# Patient Record
Sex: MALE | Race: BLACK OR AFRICAN AMERICAN | Employment: FULL TIME | URBAN - METROPOLITAN AREA
[De-identification: names, ages, dates, MRNs, and addresses within clinical notes are randomized per-mention and may not be internally consistent; named-entity substitution may affect disease eponyms.]

---

## 2019-03-28 ENCOUNTER — OFFICE VISIT (OUTPATIENT)
Dept: FAMILY MEDICINE CLINIC | Age: 19
End: 2019-03-28
Payer: COMMERCIAL

## 2019-03-28 VITALS
HEIGHT: 72 IN | SYSTOLIC BLOOD PRESSURE: 114 MMHG | HEART RATE: 75 BPM | WEIGHT: 221 LBS | BODY MASS INDEX: 29.93 KG/M2 | RESPIRATION RATE: 16 BRPM | TEMPERATURE: 98.1 F | DIASTOLIC BLOOD PRESSURE: 74 MMHG | OXYGEN SATURATION: 98 %

## 2019-03-28 DIAGNOSIS — J06.9 VIRAL URI: Primary | ICD-10-CM

## 2019-03-28 LAB — S PYO AG THROAT QL: NORMAL

## 2019-03-28 PROCEDURE — 99213 OFFICE O/P EST LOW 20 MIN: CPT | Performed by: PHYSICIAN ASSISTANT

## 2019-03-28 PROCEDURE — 87880 STREP A ASSAY W/OPTIC: CPT | Performed by: PHYSICIAN ASSISTANT

## 2019-03-28 RX ORDER — LORATADINE 10 MG/1
10 TABLET ORAL DAILY
Qty: 15 TABLET | Refills: 0 | Status: SHIPPED | OUTPATIENT
Start: 2019-03-28

## 2021-01-21 ENCOUNTER — APPOINTMENT (OUTPATIENT)
Dept: GENERAL RADIOLOGY | Age: 21
End: 2021-01-21
Payer: COMMERCIAL

## 2021-01-21 ENCOUNTER — HOSPITAL ENCOUNTER (EMERGENCY)
Age: 21
Discharge: HOME | End: 2021-01-21
Payer: COMMERCIAL

## 2021-01-21 VITALS
DIASTOLIC BLOOD PRESSURE: 75 MMHG | TEMPERATURE: 97.6 F | HEART RATE: 68 BPM | BODY MASS INDEX: 29.12 KG/M2 | RESPIRATION RATE: 16 BRPM | SYSTOLIC BLOOD PRESSURE: 140 MMHG | WEIGHT: 215 LBS | OXYGEN SATURATION: 98 % | HEIGHT: 72 IN

## 2021-01-21 DIAGNOSIS — S93.402A SPRAIN OF LEFT ANKLE, UNSPECIFIED LIGAMENT, INITIAL ENCOUNTER: Primary | ICD-10-CM

## 2021-01-21 PROCEDURE — 99284 EMERGENCY DEPT VISIT MOD MDM: CPT

## 2021-01-21 PROCEDURE — 73610 X-RAY EXAM OF ANKLE: CPT

## 2021-01-21 PROCEDURE — 6370000000 HC RX 637 (ALT 250 FOR IP): Performed by: PHYSICIAN ASSISTANT

## 2021-01-21 RX ORDER — IBUPROFEN 800 MG/1
800 TABLET ORAL ONCE
Status: COMPLETED | OUTPATIENT
Start: 2021-01-21 | End: 2021-01-21

## 2021-01-21 RX ORDER — NAPROXEN 500 MG/1
500 TABLET ORAL 2 TIMES DAILY
Qty: 14 TABLET | Refills: 0 | Status: SHIPPED | OUTPATIENT
Start: 2021-01-21 | End: 2021-01-28

## 2021-01-21 RX ADMIN — IBUPROFEN 800 MG: 800 TABLET ORAL at 23:07

## 2021-01-21 ASSESSMENT — PAIN SCALES - GENERAL: PAINLEVEL_OUTOF10: 8

## 2021-01-21 NOTE — LETTER
[address] [facility] Emergency Department  [address] [place] [address]  Phone: [phone]         January 22, 2021     Patient: Mike Age   YOB: 2000   Date of Visit: 1/21/2021       To Whom It May Concern:    Dylan Liz was seen and treated in our emergency department on 1/21/2021. The   follow up should be arranged as soon as possible with the company's occupational health provider* or the specialist to whom the worker was referred For work release or return to work.     *If the company does not have an occupational health provider, follow up should be at [address] [facility]  [address]  Call in 1 day            Sincerely,        Maddi Tobias RN        Signature:__________________________________

## 2021-01-22 NOTE — ED NOTES
Bed: 11  Expected date:   Expected time:   Means of arrival:   Comments:  EMS      Erickson Andino RN  44/97/37 [id number]

## 2021-01-22 NOTE — ED PROVIDER NOTES
[facility]  HPI:  1/21/21, Time: 10:23 PM BRYAN Graham III is a 23 y.o. male presenting to the ED for left ankle injury, beginning prior to arrival the complaint has been persistent, moderate in severity, and worsened by movement of left Ankle . Patient comes in with complaint of left ankle injury. He was at work is on the last step and when he jumped down he caught the edge of a sledgehammer causing his ankle to invert. Did not fall there was no head injury no loss of consciousness no neck pain. Denies any numbness tingling. Patient was brought by EMS    Review of Systems:   A complete review of systems was performed and pertinent positives and negatives are stated within HPI, all other systems reviewed and are negative.          --------------------------------------------- PAST HISTORY ---------------------------------------------  Past Medical History:  has a past medical history of Benign focal epilepsy of childhood ([facility] [address] Past Surgical History:  has no past surgical history on file. Social History:  reports that he has never smoked. He has never used smokeless tobacco. He reports that he does not drink alcohol or use drugs. Family History: family history includes Cancer in his maternal aunt and maternal grandfather; Diabetes in his paternal grandfather; Heart Disease in his paternal grandfather; Hypertension in his paternal grandfather; Liver Disease in his mother; Sickle Cell Anemia in his maternal grandfather; Sickle Cell Trait in his maternal aunt. The patients home medications have been reviewed. Allergies: Bee venom and Desonide    -------------------------------------------------- RESULTS -------------------------------------------------  All laboratory and radiology results have been personally reviewed by myself   LABS:  No results found for this visit on 01/21/21.     RADIOLOGY:  Interpreted by Radiologist.  XR ANKLE LEFT (MIN 3 VIEWS)   Final Result   Lateral malleolar soft tissue swelling and small joint effusion. No acute   osseous findings with preserved overall alignment at this time. RECOMMENDATION:   In the setting of trauma, if there is persistent symptoms and physical exam   warrants a repeat radiograph in 10-14 days could be considered as occult   fractures may not be evident on initial imaging evaluation.          ------------------------- NURSING NOTES AND VITALS REVIEWED ---------------------------   The nursing notes within the ED encounter and vital signs as below have been reviewed. BP (!) 140/75   Pulse 68   Temp 97.6 °F (36.4 °C)   Resp 16   Ht 6' (1.829 m)   Wt 215 lb (97.5 kg)   SpO2 98%   BMI 29.16 kg/m²   Oxygen Saturation Interpretation: Normal      ---------------------------------------------------PHYSICAL EXAM--------------------------------------      Constitutional/General: Alert and oriented x3, well appearing, non toxic in NAD  Head: Normocephalic and atraumatic  Eyes: PERRL, EOMI  Mouth: Oropharynx clear, handling secretions, no trismus  Neck: Supple, full ROM, no vertebral point tenderness   pulmonary: Lungs clear to auscultation bilaterally, no wheezes, rales, or rhonchi. Not in respiratory distress  Cardiovascular:  Regular rate and rhythm, no murmurs, gallops, or rubs. 2+ distal pulses  Abdomen: Soft, non tender, non distended,   Extremities: Moves all extremities x 4. Warm and well perfused tenderness of the left lateral malleolus with swelling present there is no tenderness of the dorsal aspect of the foot pulses normal cap refill less than 2 seconds normal sensation  Skin: warm and dry without rash  Neurologic: GCS 15,  Psych: Normal Affect      ------------------------------ ED COURSE/MEDICAL DECISION MAKING----------------------  Medications   ibuprofen (ADVIL;MOTRIN) tablet 800 mg (800 mg Oral Given 1/21/21 2307)         ED COURSE:       Medical Decision Making:    Patient came in with injury to left ankle.   X-ray no acute fracture he was placed in a ankle Aircast given crutches discharged with Naprosyn he is to follow-up with occupational medicine if Worker's Comp. applies. Primary care if not being seen for Worker's Comp. Counseling: The emergency provider has spoken with the patient and discussed todays results, in addition to providing specific details for the plan of care and counseling regarding the diagnosis and prognosis. Questions are answered at this time and they are agreeable with the plan.      --------------------------------- IMPRESSION AND DISPOSITION ---------------------------------    IMPRESSION  1. Sprain of left ankle, unspecified ligament, initial encounter        DISPOSITION  Disposition: Discharge to home  Patient condition is good      NOTE: This report was transcribed using voice recognition software.  Every effort was made to ensure accuracy; however, inadvertent computerized transcription errors may be present     Lynn Wu  01/21/21 [id number]

## 2021-03-08 ENCOUNTER — HOSPITAL ENCOUNTER (OUTPATIENT)
Dept: PHYSICAL THERAPY | Age: 21
Setting detail: THERAPIES SERIES
Discharge: HOME | End: 2021-03-08
Payer: COMMERCIAL

## 2021-03-08 PROCEDURE — G0283 ELEC STIM OTHER THAN WOUND: HCPCS

## 2021-03-08 PROCEDURE — 97161 PT EVAL LOW COMPLEX 20 MIN: CPT

## 2021-03-08 NOTE — PROGRESS NOTES
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 ON 2021 client w was stepping down from a height of approximately 3 feet Client's left foot came down on a sledgehammer head causing an acute inversion injury. Client was seen by ER same day. Xrays were negative Client followed up with [facility] and was also seen by Dr Xavi Zapata on an Orthopedic consult. No past hx of left ankle injury  Family / Caregiver Present: No  Referring Practitioner: Dr Xavi Zapata  Referral Date : 21  Diagnosis: Left Ankle Sprain  Follows Commands: Within Functional Limits  PT Visit Information  Onset Date: 21  PT Insurance Information: [employer]  Total # of Visits Approved: 18  Plan of Care/Certification Expiration Date: 21  Subjective  Subjective: Client reports the acute swelling and pain ghave reduced but not resolved Client is not currently working Activity is limited by left ankle pain  Pain Screening  Patient Currently in Pain: No  Vital Signs  Patient Currently in Pain: No    Vision/Hearing  Vision  Vision: Within Functional Limits  Hearing  Hearing: Within functional limits    Orientation  Orientation  Overall Orientation Status: Within Functional Limits    Social/Functional History  Social/Functional History  Lives With: Family  Type of Home: House  Home Layout: Work area in [employer] Responsibilities: No  Active : Yes  Mode of Transportation: Car  Occupation: Full time employment; Workers comp    Objective     Observation/Palpation  Palpation: Pain with palpation left ankle lateral malleolus Moderate edema palpable

## 2021-03-09 ENCOUNTER — HOSPITAL ENCOUNTER (OUTPATIENT)
Dept: PHYSICAL THERAPY | Age: 21
Setting detail: THERAPIES SERIES
Discharge: HOME | End: 2021-03-09
Payer: COMMERCIAL

## 2021-03-09 NOTE — PROGRESS NOTES
[facility]  Phone: [phone] Fax: [phone]       Physical Therapy Daily Treatment Note  Date:  3/9/2021    Patient Name:  Harman Peterson    :  2000  MRN: [id number]    Restrictions/Precautions:    Diagnosis:  Left Ankle Sprain  Treatment Diagnosis:    Insurance/Certification information: [employer] (18 visits thru 2021)  Referring Physician: Dr. Elnoria Ormond of care signed (Y/N):    Visit# / total visits:    Pain level: /10  Time In: 7:15       Time Out: 8:00         Subjective: Pt reports stiffness in his left ankle which he notes is typical in the morning. Exercises:  Exercise/Equipment Resistance/Repetitions Other comments   Bike  7 min    Calf stretch 10\" 5 x w/flexband   Inv/Ev stretch 10\" 5 x each W/flexband   Active DF/PF 10 x                                                                                                                  Other Therapeutic Activities:      Home Exercise Program:      Manual Treatments:      Modalities: IFC and ice to left ankle x 20 min for pain and edema control    Timed Code Treatment Minutes:  40    Total Treatment Minutes:  45    Treatment/Activity Tolerance:  [x] Patient tolerated treatment well [] Patient limited by fatigue  [] Patient limited by pain  [] Patient limited by other medical complications  [x] Other: Good tolerance for initiation of exercises. Plan:   [x] Continue per plan of care [] Alter current plan (see comments)  [] Plan of care initiated [] Hold pending MD visit [] Discharge  Plan for Next Session:         Treatment Charges: Mins Units   Initial Evaluation     Re-Evaluation     Ther Exercise         TE 20 1   Manual Therapy     MT     Ther Activities        TA     Gait Training          GT     Neuro Re-education NR     Modalities 20 1   Non-Billable Service Time     Other 5    Total Time/Units 45 2     Electronically signed by:   Devonte Templeton [id number]

## 2021-03-11 ENCOUNTER — HOSPITAL ENCOUNTER (OUTPATIENT)
Dept: PHYSICAL THERAPY | Age: 21
Setting detail: THERAPIES SERIES
Discharge: HOME | End: 2021-03-11
Payer: COMMERCIAL

## 2021-03-11 PROCEDURE — G0283 ELEC STIM OTHER THAN WOUND: HCPCS

## 2021-03-11 PROCEDURE — 97110 THERAPEUTIC EXERCISES: CPT

## 2021-03-11 NOTE — PROGRESS NOTES
[facility]  Phone: [phone] Fax: [phone]       Physical Therapy Daily Treatment Note  Date:  3/11/2021    Patient Name:  Neda Stevenson    :  2000  MRN: [id number]    Restrictions/Precautions:    Diagnosis:  Left Ankle Sprain  Treatment Diagnosis:    Insurance/Certification information: [employer] (18 visits thru 2021)  Referring Physician: Dr. Boss [place] of care signed (Y/N):    Visit# / total visits:  3/18  Pain level: /10  Time In: 7:15       Time Out: 8:05        Subjective: Pt states he is returning to light duty work next week. He voices no new c/o this morning. Exercises:  Exercise/Equipment Resistance/Repetitions Other comments   Bike  10 min    Calf stretch 10\" 5 x w/flexband   Inv/Ev stretch 10\" 5 x each W/flexband   Active DF/PF 10 x nt          Tband DF/PF/Inv/Ev GTB 10 x each           Step to disc fwd 5\" 5 x                                                                                      Other Therapeutic Activities:      Home Exercise Program:  (3/11/2021) Instructed pt in light strengthening using tband. Gave pt GTB for home. Manual Treatments:      Modalities: IFC and ice to left ankle x 20 min for pain and edema control    Timed Code Treatment Minutes:  45    Total Treatment Minutes:  50    Treatment/Activity Tolerance:  [x] Patient tolerated treatment well [] Patient limited by fatigue  [] Patient limited by pain  [] Patient limited by other medical complications  [x] Other: Good tolerance for progression of exercises.      Plan:   [x] Continue per plan of care [] Alter current plan (see comments)  [] Plan of care initiated [] Hold pending MD visit [] Discharge  Plan for Next Session:         Treatment Charges: Mins Units   Initial Evaluation     Re-Evaluation     Ther Exercise         TE 25 2   Manual Therapy     MT     Ther Activities        TA     Gait Training          GT     Neuro Re-education NR

## 2021-03-15 ENCOUNTER — HOSPITAL ENCOUNTER (OUTPATIENT)
Dept: PHYSICAL THERAPY | Age: 21
Setting detail: THERAPIES SERIES
Discharge: HOME | End: 2021-03-15
Payer: COMMERCIAL

## 2021-03-15 PROCEDURE — 97110 THERAPEUTIC EXERCISES: CPT

## 2021-03-15 PROCEDURE — G0283 ELEC STIM OTHER THAN WOUND: HCPCS

## 2021-03-15 NOTE — PROGRESS NOTES
[facility]  Phone: [phone] Fax: [phone]       Physical Therapy Daily Treatment Note  Date:  3/15/2021    Patient Name:  Talia Luther    :  2000  MRN: [id number]    Restrictions/Precautions:    Diagnosis:  Left Ankle Sprain  Treatment Diagnosis:    Insurance/Certification information: [employer] (18 visits thru 2021)  Referring Physician: Dr. Delma Pagan of care signed (Y/N):    Visit# / total visits:    Pain level: /10  Time In: 1625      Time Out: 1710        Subjective:    Exercises:  Exercise/Equipment Resistance/Repetitions Other comments   Bike  10 min    Calf stretch 10\" 5 x w/flexband                    Tband DF/PF/Inv/Ev GTB 10 x each           Step to disc fwd 5\" 5 x nt     BAPS FWD/Back Level 2 10 reps 2 sets     Baps Side/side    Level 2 10 reps 2 sets     BAPS CW/CCW     Level 2 5 reps 2 sets each                                                               Other Therapeutic Activities:      Home Exercise Program:  (3/11/2021) Instructed pt in light strengthening using tband. Gave pt GTB for home. Manual Treatments:      Modalities: IFC and ice to left ankle x 20 min for pain and edema control    Timed Code Treatment Minutes:  30    Total Treatment Minutes:  50    Treatment/Activity Tolerance:  [x] Patient tolerated treatment well [] Patient limited by fatigue  [] Patient limited by pain  [] Patient limited by other medical complications  [x] Other: Good tolerance for progression of exercises.      Plan:   [x] Continue per plan of care [] Alter current plan (see comments)  [] Plan of care initiated [] Hold pending MD visit [] Discharge  Plan for Next Session:         Treatment Charges: Mins Units   Initial Evaluation     Re-Evaluation     Ther Exercise         TE 25 2   Manual Therapy     MT     Ther Activities        TA     Gait Training          GT     Neuro Re-education NR     Modalities 20 1   Non-Billable Service Time     Other 5    Total Time/Units 40 2     Electronically signed by:  Kassandra Bolden [id number]

## 2021-03-17 ENCOUNTER — HOSPITAL ENCOUNTER (OUTPATIENT)
Dept: PHYSICAL THERAPY | Age: 21
Setting detail: THERAPIES SERIES
Discharge: HOME | End: 2021-03-17
Payer: COMMERCIAL

## 2021-03-17 PROCEDURE — 97110 THERAPEUTIC EXERCISES: CPT

## 2021-03-17 NOTE — FLOWSHEET NOTE
[facility]  Phone: [phone] Fax: [phone]     Industrial Rehabilitation Initial Evaluation      Client Name: Paty Marroquin     Claim Number:[id number]  Evaluation date:03/08/2021                                     Job Title : Head Stretcher   Diagnosis:Left ankle Injury R20.672X                         Normal Work Hrs:  _40_/wk  Referring Physician: Dr Arcenio Butt                                   Present work status:   ________  First date of Lost time:01/21/2021                            * Not working  _X__  Date of Injury: 01/21/2021                                          Ileene Collar Duty    ____  Employer:[employer]                                     * Reg.  Duty     ____    Authorized Services:   _X__ Physical Therapy Exercises  ___ Work Conditioning  ___ Aquatics  _X__ Manual therapy  _X__ Electrical Stimulation, Traction, Ultrasound  _X__ Education/Body mechanics  ___ Ergonomic Assessment/FCE    Authorized Visits: ________  [employer] __18___Visits  By 04/13/2021   From __03/08/2021__ to 04/13/2021    Vocational Status:  Employer: [employer]   Job Title: Head Stretcher     Rehabilitation Problems/Impairments:  [x]Pain  Lateral and postero-lateral left ankle 4-6/10   [x]Decreased ROM left anjkle All ranges limited   []Joint Hypomobility:______________________________________  []Joint Hypermobility:______________________________________  []Muscle Spasms:_________________________________________  [x]Gait:Reduced stance time and reduced step length left LE   [x]Decreased strength Left ankle most notable Inversion and eversion   []Unsafe body mechanics related to work/home activities  [x]Subjective reports of pain limiting work/home activities  []Inability to control onset symptoms  []Load handling strength levels below employable levels  []Work endurances less than required for employment levels  [x]Other:Moderate to Marked  edema over the left lateral Malleolus Short Term Rehabilitation Goals:                    [x] Decrease pain _5/10 or less                     [] Increase ROM:___________________________________________                    [] Improve Joint play:________________________________________                    [] Improve strength:_________________________________________                    []Gait: __________________________________________________                    [] Reduce Muscle Spasms:____________________________________                    [] Improve Body Mechanics associated with work/Home activities. [x] Instruct with symptom control techniques/ HEP                    [] Improve endurance levels                     [] Lift floor-waist __________#                     [] Lift waist-shoulder _______#                    [] Horizontal carry _________Ft ________#                    [] Elevated work ________min _________#                    [x] Other: Left ankle edema Moderate or less     Long Term Rehabilitation Goals:   [x]RTW  ( SJ/SE  [x]Decrease Pain _3/10 or less with work and home ADL's and normal ambulation   [x]Improve AROM  Of the left ankle to WFL's all ranges   [x]Improve Strength Left ankle to 4- to 4/5   []Improve Joint play (WNL), _______________________________  []Body Mechanics:________________________________________              [x]Gait: Minimal deviation over functional distances   [x]Independent with HEP/Symptom control techniques:  []Lift Floor-waist_____#  []Lift waist-Shoulder________#  []Horizontal Carry ______ft______#  []Elevated work ____min____#  []Other:_________________________________________________    Rehabilitation Recommendations:                    ?[x] Begin PT at _2-3_____x/wk x _6_____wks. []Begin PT with Work Conditioning to follow at ______wks.                    []Begin Work-Conditioning and conclude with FCE _____                   []? Begin PT with Vocational Rehabilitation referral/request--[facility]                   ? [] Ergonomic Study/Job Analysis to compare FCE/clients abilities. ____    Recommendations For RTW accommodations:  None at present           Physician: ____________________  Therapist: Patsy Izaguirre, [facility]  : ____________________

## 2021-03-17 NOTE — PROGRESS NOTES
[facility]  Phone: [phone] Fax: [phone]       Physical Therapy Daily Treatment Note  Date:  3/17/2021    Patient Name:  Rao Tavarez    :  2000  MRN: [id number]    Restrictions/Precautions:    Diagnosis:  Left Ankle Sprain  Treatment Diagnosis:    Insurance/Certification information: [employer] (18 visits thru 2021)  Referring Physician: Dr. Gerald Francisco of care signed (Y/N):    Visit# / total visits:    Pain level: /10  Time In: 16:20     Time Out: 16:50        Subjective: Pt reports no pain/edema related to returning to work this week. States he is working light duty but it includes walking a lot. Exercises:  Exercise/Equipment Resistance/Repetitions Other comments   Bike  10 min    Calf stretch 10\" 5 x w/flexband   Inv/Ev stretch 10\" 5 x w/flexband               Tband DF/PF/Inv/Ev GTB 10 x each nt          Step to disc fwd 5\" 10 x      BAPS FWD/Back Level 3 15 reps     Baps Side/side    Level 3 15 reps    BAPS CW/CCW     Level 3 10 reps  each                                                               Other Therapeutic Activities:      Home Exercise Program:  (3/11/2021) Instructed pt in light strengthening using tband. Gave pt GTB for home. Manual Treatments:      Modalities:  NT per pt    Timed Code Treatment Minutes:  25    Total Treatment Minutes:  30    Treatment/Activity Tolerance:  [x] Patient tolerated treatment well [] Patient limited by fatigue  [] Patient limited by pain  [] Patient limited by other medical complications  [x] Other: Pt is tolerating progression of left ankle ROM/strengthening exercises without c/o.      Plan:   [x] Continue per plan of care [] Alter current plan (see comments)  [] Plan of care initiated [] Hold pending MD visit [] Discharge  Plan for Next Session:         Treatment Charges: Mins Units   Initial Evaluation     Re-Evaluation     Ther Exercise         TE 25 2   Manual Therapy     MT     Ther Activities        TA     Gait Training          GT     Neuro Re-education NR     Modalities     Non-Billable Service Time     Other 5    Total Time/Units 30 2     Electronically signed by:   Puma Elmore

## 2021-03-23 ENCOUNTER — HOSPITAL ENCOUNTER (OUTPATIENT)
Dept: PHYSICAL THERAPY | Age: 21
Setting detail: THERAPIES SERIES
Discharge: HOME | End: 2021-03-23
Payer: COMMERCIAL

## 2021-03-23 PROCEDURE — 97110 THERAPEUTIC EXERCISES: CPT

## 2021-03-23 PROCEDURE — G0283 ELEC STIM OTHER THAN WOUND: HCPCS

## 2021-03-23 NOTE — PROGRESS NOTES
[facility]  Phone: [phone] Fax: [phone]       Physical Therapy Daily Treatment Note  Date:  3/23/2021    Patient Name:  Vick Miller    :  2000  MRN: [id number]    Restrictions/Precautions:    Diagnosis:  Left Ankle Sprain  Treatment Diagnosis:    Insurance/Certification information: [employer] (18 visits thru 2021)  Referring Physician: Dr. Shine Saez of care signed (Y/N):    Visit# / total visits:    Pain level: /10  Time In: 15:35     Time Out: 16:25        Subjective: Pt reports no pain this afternoon. States when he has pain it is usually first thing in the morning or at night. Exercises:  Exercise/Equipment Resistance/Repetitions Other comments   Bike  10 min    Calf stretch 10\" 5 x @ incline   Toe raises 10 x    Step to disc 5\" 10 x    Resisted side stepping   OTB 20' x 2 B                                     Unil stand (L) reach med ball   2 kg 10 x                Diagonal reach 2 kg 7 x B            Throw @ rebounder                                   Other Therapeutic Activities:      Home Exercise Program:  (3/11/2021) Instructed pt in light strengthening using tband. Gave pt GTB for home. Manual Treatments:      Modalities: IFC and ice to left ankle x 20 min for pain and edema control - pt seated    Timed Code Treatment Minutes:  45    Total Treatment Minutes:  50    Treatment/Activity Tolerance:  [x] Patient tolerated treatment well [] Patient limited by fatigue  [] Patient limited by pain  [] Patient limited by other medical complications  [x] Other: Pt tolerated ex progression well. He reports numbness in his left foot after estim and ice when treated on a table and again today seated in a chair w/LLE on a stool. This resolves within a few minutes of changing position per pt. Will discuss with PT and modify treatment as appropriate.      Plan:   [x] Continue per plan of care [] Alter current plan (see comments)  [] Plan of care initiated [] Hold pending MD visit [] Discharge  Plan for Next Session:         Treatment Charges: Mins Units   Initial Evaluation     Re-Evaluation     Ther Exercise         TE 25 2   Manual Therapy     MT     Ther Activities        TA     Gait Training          GT     Neuro Re-education NR     Modalities 20 1   Non-Billable Service Time     Other 5    Total Time/Units 50 2     Electronically signed by:   Puma Elmore

## 2021-03-25 ENCOUNTER — HOSPITAL ENCOUNTER (OUTPATIENT)
Dept: PHYSICAL THERAPY | Age: 21
Setting detail: THERAPIES SERIES
Discharge: HOME | End: 2021-03-25
Payer: COMMERCIAL

## 2021-03-25 PROCEDURE — G0283 ELEC STIM OTHER THAN WOUND: HCPCS

## 2021-03-25 PROCEDURE — 97110 THERAPEUTIC EXERCISES: CPT

## 2021-03-25 NOTE — PROGRESS NOTES
Evaluation     Re-Evaluation     Ther Exercise         TE 20 1   Manual Therapy     MT     Ther Activities        TA     Gait Training          GT     Neuro Re-education NR     Modalities 20 1   Non-Billable Service Time     Other 5    Total Time/Units 45 2     Electronically signed by:   Puma Elmore

## 2021-03-30 ENCOUNTER — HOSPITAL ENCOUNTER (OUTPATIENT)
Dept: PHYSICAL THERAPY | Age: 21
Setting detail: THERAPIES SERIES
Discharge: HOME | End: 2021-03-30
Payer: COMMERCIAL

## 2021-03-31 ENCOUNTER — HOSPITAL ENCOUNTER (OUTPATIENT)
Dept: PHYSICAL THERAPY | Age: 21
Setting detail: THERAPIES SERIES
Discharge: HOME | End: 2021-03-31
Payer: COMMERCIAL

## 2021-03-31 PROCEDURE — G0283 ELEC STIM OTHER THAN WOUND: HCPCS

## 2021-03-31 PROCEDURE — 97110 THERAPEUTIC EXERCISES: CPT

## 2021-03-31 NOTE — PROGRESS NOTES
[facility]  Phone: [phone] Fax: [phone]       Physical Therapy Daily Treatment Note  Date:  3/31/2021    Patient Name:  Lokesh Cardona    :  2000  MRN: [id number]    Restrictions/Precautions:    Diagnosis:  Left Ankle Sprain  Treatment Diagnosis:    Insurance/Certification information: [employer] (18 visits thru 2021)  Referring Physician: Dr. Ann-Marie Graham of care signed (Y/N):    Visit# / total visits:    Pain level: /10  Time In: 13:25     Time Out: 14:20        Subjective: Pt to therapy after follow up with Dr. Benjy Sandoval. He is to continue light duty and PT and return for follow up on 2021. Exercises:  Exercise/Equipment Resistance/Repetitions Other comments   Bike  10 min    DF/Inv/EV str w/flexband 5 x each    Toe raises 10 x    Step to disc fwd 5\" 10 x                           lateral 5\" 10 x Close S for stability   Resisted side stepping   OTB 20' x 2 B nt   LLE stand/R heel fwd touch down 4\" 10 x                                Unil stand (L) reach med ball   2 kg 10 x                Diagonal reach 2 kg 5 x B            Throw @ rebounder 2 kg 10 x                                  Other Therapeutic Activities:      Home Exercise Program:  (3/11/2021) Instructed pt in light strengthening using tband. Gave pt GTB for home. Manual Treatments:      Modalities: IFC and ice to left ankle x 20 min for pain and edema control - pt prone    Timed Code Treatment Minutes:  50    Total Treatment Minutes:  55    Treatment/Activity Tolerance:  [x] Patient tolerated treatment well [] Patient limited by fatigue  [] Patient limited by pain  [] Patient limited by other medical complications  [x] Other: Fair stability in left ankle for lateral step to disc. No pain c/o with ex.       Plan:   [x] Continue per plan of care [] Alter current plan (see comments)  [] Plan of care initiated [] Hold pending MD visit [] Discharge  Plan for Next Session:         Treatment Charges: Mins Units   Initial Evaluation     Re-Evaluation     Ther Exercise         TE 30 2   Manual Therapy     MT     Ther Activities        TA     Gait Training          GT     Neuro Re-education NR     Modalities 20 1   Non-Billable Service Time     Other 5    Total Time/Units 55 3     Electronically signed by:   Puma Elmore

## 2021-04-06 ENCOUNTER — HOSPITAL ENCOUNTER (OUTPATIENT)
Dept: PHYSICAL THERAPY | Age: 21
Setting detail: THERAPIES SERIES
Discharge: HOME | End: 2021-04-06
Payer: COMMERCIAL

## 2021-04-06 PROCEDURE — 97110 THERAPEUTIC EXERCISES: CPT

## 2021-04-06 NOTE — PROGRESS NOTES
[facility]  Phone: [phone] Fax: [phone]       Physical Therapy Daily Treatment Note  Date:  2021    Patient Name:  Sandra Singh    :  2000  MRN: [id number]    Restrictions/Precautions:    Diagnosis:  Left Ankle Sprain  Treatment Diagnosis:    Insurance/Certification information: [employer] (18 visits thru 2021)  Referring Physician: Dr. Saurabh Mei of care signed (Y/N):    Visit# / total visits:    Pain level: /10  Time In: 13:20     Time Out: 14:00        Subjective: Pt reports no left ankle pain today. Exercises:  Exercise/Equipment Resistance/Repetitions Other comments   Bike  10 min    Calf stretch 10\" 5 x @ incline   DF/Inv/EV str w/flexband 5 x each    Toe raises 10 x    Step to disc fwd 5\" 10 x                           lateral 5\" 10 x Close S for stability   Resisted side stepping   OTB 20' x 2 B    LLE stand/R heel fwd touch down 6\" 10 x                                Unil stand (L) reach med ball   2 kg 10 x                Diagonal reach 2 kg 10 x B            Throw @ rebounder 2 kg 10 x                                  Other Therapeutic Activities:      Home Exercise Program:  (3/11/2021) Instructed pt in light strengthening using tband. Gave pt GTB for home.      Manual Treatments:      Modalities:-  NT per pt-no pain    Timed Code Treatment Minutes:  35    Total Treatment Minutes:  40    Treatment/Activity Tolerance:  [x] Patient tolerated treatment well [] Patient limited by fatigue  [] Patient limited by pain  [] Patient limited by other medical complications  [] Other:         Plan:   [x] Continue per plan of care [] Alter current plan (see comments)  [] Plan of care initiated [] Hold pending MD visit [] Discharge  Plan for Next Session:         Treatment Charges: Mins Units   Initial Evaluation     Re-Evaluation     Ther Exercise         TE 35 2   Manual Therapy     MT     Ther Activities        TA Gait Training          GT     Neuro Re-education NR     Modalities     Non-Billable Service Time     Other 5    Total Time/Units 40 2     Electronically signed by:   Puma Elmore

## 2021-04-08 ENCOUNTER — HOSPITAL ENCOUNTER (OUTPATIENT)
Dept: PHYSICAL THERAPY | Age: 21
Setting detail: THERAPIES SERIES
Discharge: HOME | End: 2021-04-08
Payer: COMMERCIAL

## 2021-04-08 PROCEDURE — 97110 THERAPEUTIC EXERCISES: CPT

## 2021-04-12 ENCOUNTER — HOSPITAL ENCOUNTER (OUTPATIENT)
Dept: PHYSICAL THERAPY | Age: 21
Setting detail: THERAPIES SERIES
Discharge: HOME | End: 2021-04-12
Payer: COMMERCIAL

## 2021-04-12 PROCEDURE — 97110 THERAPEUTIC EXERCISES: CPT

## 2021-04-12 NOTE — PROGRESS NOTES
[facility]  Phone: [phone] Fax: [phone]       Physical Therapy Daily Treatment Note  Date:  2021    Patient Name:  Yisel Dickson    :  2000  MRN: [id number]    Restrictions/Precautions:    Diagnosis:  Left Ankle Sprain  Treatment Diagnosis:    Insurance/Certification information: [employer] (18 visits thru 6/15/2021)  Referring Physician: Dr. Crow Hightower of care signed (Y/N):    Visit# / total visits:   Pain level: /10  Time In: 15:25     Time Out: 16:00        Subjective: Pt has no new c/o today. Exercises:  Exercise/Equipment Resistance/Repetitions Other comments   Bike  10 min    Calf stretch 10\" 5 x @ incline   DF/Inv/EV str w/flexband 5 x each After ex   Toe raises 10 x 2    Step to disc fwd 5\" 10 x                           lateral 5\" 10 x Close S for stability   Resisted side stepping   OTB 20' x 2 B nt   LLE stand/R heel fwd touch down 6\" 15 x                   Lateral step down 6\" 15 x         Squats on discs 10 x    Lunge to BOSU 10 x B alternating              Unilateral stand (L) reach w/med ball - fwd 3kg 10 x                Lateral reach 3 kg 10 x B            Throw @ rebounder 2 kg 20 x                                  Other Therapeutic Activities:      Home Exercise Program:  (3/11/2021) Instructed pt in light strengthening using tband. Gave pt GTB for home.      Manual Treatments:      Modalities:-  NT per pt-no pain    Timed Code Treatment Minutes:  35    Total Treatment Minutes:  35    Treatment/Activity Tolerance:  [x] Patient tolerated treatment well [] Patient limited by fatigue  [] Patient limited by pain  [] Patient limited by other medical complications  [] Other:         Plan:   [x] Continue per plan of care [] Alter current plan (see comments)  [] Plan of care initiated [] Hold pending MD visit [] Discharge  Plan for Next Session:         Treatment Charges: Mins Units   Initial Evaluation Re-Evaluation     Ther Exercise         TE 35 2   Manual Therapy     MT     Ther Activities        TA     Gait Training          GT     Neuro Re-education NR     Modalities     Non-Billable Service Time     Other     Total Time/Units 35 2     Electronically signed by:   Puma Elmore

## 2021-04-13 ENCOUNTER — HOSPITAL ENCOUNTER (OUTPATIENT)
Dept: PHYSICAL THERAPY | Age: 21
Setting detail: THERAPIES SERIES
Discharge: HOME | End: 2021-04-13
Payer: COMMERCIAL

## 2021-04-13 PROCEDURE — 97530 THERAPEUTIC ACTIVITIES: CPT

## 2021-04-13 PROCEDURE — 97110 THERAPEUTIC EXERCISES: CPT

## 2021-04-13 NOTE — PROGRESS NOTES
[facility]  Phone: [phone] Fax: [phone]       Physical Therapy Daily Treatment Note  Date:  2021    Patient Name:  Babs Diaz    :  2000  MRN: [id number]    Restrictions/Precautions:    Diagnosis:  Left Ankle Sprain  Treatment Diagnosis:    Insurance/Certification information: [employer] (18 visits thru 6/15/2021)  Referring Physician: Dr. Maggie Gerber of care signed (Y/N):    Visit# / total visits:   Pain level: /10  Time In: 15:40     Time Out: 16:15       Subjective: Pt has no new c/o today. Exercises:  Exercise/Equipment Resistance/Repetitions Other comments   Bike  10 min    Calf stretch 10\" 5 x @ incline   DF/Inv/EV str w/flexband 5 x each After ex   Toe raises 10 x 2    Step to disc fwd 5\" 10 x                           lateral 5\" 10 x Close S for stability   Resisted side stepping   OTB 20' x 2 B nt   LLE stand/R heel fwd touch down 6\" 15 x                   Lateral step down 6\" 15 x    Lateral shuffles  25' x 2 B    Squats on discs 10 x    Lunge to BOSU 10 x B alternating nt   Switch steps to 6\" box 10 x          Unilateral stand (L) reach w/med ball - fwd 3kg 10 x                Lateral reach 3 kg 10 x B            Throw @ rebounder 2 kg 20 x                                  Other Therapeutic Activities:  Negotiated 4 steps x 5 with quick pace using alternating pattern. Home Exercise Program:  (3/11/2021) Instructed pt in light strengthening using tband. Gave pt GTB for home. Manual Treatments:      Modalities:-  NT per pt-no pain    Timed Code Treatment Minutes:  35    Total Treatment Minutes:  35    Treatment/Activity Tolerance:  [x] Patient tolerated treatment well [] Patient limited by fatigue  [] Patient limited by pain  [] Patient limited by other medical complications  [x] Other: Good tolerance for progression of exercises. Pt reported no pain.    Pt noted slight hesitation for higher level activities d/t being protective of left ankle since the injury. This improved with activity. Plan:   [x] Continue per plan of care [] Alter current plan (see comments)  [] Plan of care initiated [] Hold pending MD visit [] Discharge  Plan for Next Session:         Treatment Charges: Mins Units   Initial Evaluation     Re-Evaluation     Ther Exercise         TE 20 1   Manual Therapy     MT     Ther Activities        TA 15 1   Gait Training          GT     Neuro Re-education NR     Modalities     Non-Billable Service Time     Other     Total Time/Units 35 2     Electronically signed by:   Puma Elmore

## 2021-04-20 ENCOUNTER — HOSPITAL ENCOUNTER (OUTPATIENT)
Dept: PHYSICAL THERAPY | Age: 21
Setting detail: THERAPIES SERIES
Discharge: HOME | End: 2021-04-20
Payer: COMMERCIAL

## 2021-04-20 PROCEDURE — 97530 THERAPEUTIC ACTIVITIES: CPT

## 2021-04-20 PROCEDURE — 97110 THERAPEUTIC EXERCISES: CPT

## 2021-04-20 NOTE — PROGRESS NOTES
[facility]  Phone: [phone] Fax: [phone]       Physical Therapy Daily Treatment Note  Date:  2021    Patient Name:  Noel Gandhi    :  2000  MRN: [id number]    Restrictions/Precautions:    Diagnosis:  Left Ankle Sprain  Treatment Diagnosis:    Insurance/Certification information: [employer] (18 visits thru 6/15/2021)  Referring Physician: Dr. Angelika Farris of care signed (Y/N):    Visit# / total visits:   Pain level: /10  Time In: 15:20     Time Out: 16:00       Subjective: Pt has no new c/o today. Reports he is still working light duty and has had no recent left ankle pain. Pt has a follow up with occupational med physician tomorrow. Exercises:  Exercise/Equipment Resistance/Repetitions Other comments   Bike  10 min    Calf stretch 10\" 5 x @ incline   DF/Inv/EV str w/flexband 5 x each After ex   Toe raises 10 x 2    Step to disc fwd 5\" 10 x                           lateral 5\" 10 x    Resisted side stepping   OTB 20' x 2 B nt   LLE stand/R heel fwd touch down 8\" 15 x                   Lateral step down 8\" 15 x    Lateral shuffles  25' x 2 B nt   Squats on discs 10 x    Lunge to BOSU 10 x B alternating nt   Switch steps to 6\" box 10 x  nt        Unilateral stand (L) reach w/med ball - fwd 3kg 10 x                Lateral reach 3 kg 10 x B            Throw @ rebounder 2 kg 20 x    Standing on discs throw/catch ball   Light ball @ multi level                             Other Therapeutic Activities:  Negotiated 4 steps x 5 with quick pace using alternating pattern. Home Exercise Program:  (3/11/2021) Instructed pt in light strengthening using tband. Gave pt GTB for home.      Manual Treatments:      Modalities:-  NT per pt-no pain    Timed Code Treatment Minutes:  35    Total Treatment Minutes:  40    Treatment/Activity Tolerance:  [x] Patient tolerated treatment well [] Patient limited by fatigue  [] Patient limited by pain  [] Patient limited by other medical complications  [x] Other: Good tolerance for exercises. Pt reported no pain. Plan:   [x] Continue per plan of care [] Alter current plan (see comments)  [] Plan of care initiated [] Hold pending MD visit [] Discharge  Plan for Next Session:         Treatment Charges: Mins Units   Initial Evaluation     Re-Evaluation     Ther Exercise         TE 20 1   Manual Therapy     MT     Ther Activities        TA 15 1   Gait Training          GT     Neuro Re-education NR     Modalities     Non-Billable Service Time     Other 5    Total Time/Units 40 2     Electronically signed by:   Puma Elmore

## 2021-04-27 ENCOUNTER — HOSPITAL ENCOUNTER (OUTPATIENT)
Dept: PHYSICAL THERAPY | Age: 21
Setting detail: THERAPIES SERIES
Discharge: HOME | End: 2021-04-27
Payer: COMMERCIAL

## 2021-04-27 PROCEDURE — 97530 THERAPEUTIC ACTIVITIES: CPT

## 2021-04-27 PROCEDURE — 97110 THERAPEUTIC EXERCISES: CPT

## 2021-04-27 NOTE — PROGRESS NOTES
[facility]  Phone: [phone] Fax: [phone]       Physical Therapy Daily Treatment Note  Date:  2021    Patient Name:  Lokesh Cardona    :  2000  MRN: [id number]    Restrictions/Precautions:    Diagnosis:  Left Ankle Sprain  Treatment Diagnosis:    Insurance/Certification information: [employer] (18 visits thru 6/15/2021)  Referring Physician: Dr. Ann-Marie Graham of care signed (Y/N):    Visit# / total visits:   Pain level: /10  Time In: 15:30     Time Out: 16:10       Subjective: Pt has no new c/o today. Reports no recent left ankle pain. He notes only occasional \"throbbing\" first thing in the morning. Exercises:  Exercise/Equipment Resistance/Repetitions Other comments   Bike  10 min    Calf stretch 10\" 5 x @ incline   DF/Inv/EV str w/flexband 5 x each After ex   Toe raises 10 x 2    Step to disc fwd 5\" 10 x                           lateral 5\" 10 x    Resisted side stepping   OTB 20' x 2 B nt   LLE stand/R heel fwd touch down 8\" 15 x                   Lateral step down 8\" 15 x    Lateral shuffles  25' x 2 B nt   Squats on discs 10 x    Lunge to BOSU 10 x B alternating nt   Switch steps to 8\" box 15 x          Unilateral stand (L) reach w/med ball - fwd 3kg 10 x nt               Lateral reach 3 kg 10 x B nt           Throw @ rebounder 2 kg 20 x nt   Standing on discs throw/catch ball   Med ball @ multi level                             Other Therapeutic Activities:  . Home Exercise Program:  (3/11/2021) Instructed pt in light strengthening using tband. Gave pt GTB for home. Manual Treatments:      Modalities:-  NT per pt-no pain    Timed Code Treatment Minutes:  40    Total Treatment Minutes:  40    Treatment/Activity Tolerance:  [x] Patient tolerated treatment well [] Patient limited by fatigue  [] Patient limited by pain  [] Patient limited by other medical complications  [x] Other: Good tolerance for exercises.  Pt

## 2021-04-29 ENCOUNTER — APPOINTMENT (OUTPATIENT)
Dept: PHYSICAL THERAPY | Age: 21
End: 2021-04-29
Payer: COMMERCIAL

## 2022-09-30 ENCOUNTER — HOSPITAL ENCOUNTER (EMERGENCY)
Age: 22
Discharge: HOME | End: 2022-09-30
Payer: COMMERCIAL

## 2022-09-30 VITALS
TEMPERATURE: 98.3 F | BODY MASS INDEX: 31.81 KG/M2 | HEIGHT: 73 IN | HEART RATE: 86 BPM | DIASTOLIC BLOOD PRESSURE: 82 MMHG | SYSTOLIC BLOOD PRESSURE: 136 MMHG | RESPIRATION RATE: 15 BRPM | WEIGHT: 240 LBS | OXYGEN SATURATION: 99 %

## 2022-09-30 DIAGNOSIS — R21 RASH OF PENIS: ICD-10-CM

## 2022-09-30 DIAGNOSIS — R30.0 DYSURIA: Primary | ICD-10-CM

## 2022-09-30 LAB
BACTERIA: NORMAL /HPF
BILIRUBIN URINE: ABNORMAL
BLOOD, URINE: NEGATIVE
CLARITY: CLEAR
COLOR: YELLOW
GLUCOSE URINE: NEGATIVE MG/DL
HSV 1 BY PCR: ABNORMAL
HSV 2 BY PCR: ABNORMAL
KETONES, URINE: ABNORMAL MG/DL
LEUKOCYTE ESTERASE, URINE: NEGATIVE
NITRITE, URINE: NEGATIVE
ORGANISM: ABNORMAL
PH UA: 6 (ref 5–9)
PROTEIN UA: ABNORMAL MG/DL
RBC UA: NORMAL /HPF (ref 0–2)
SPECIFIC GRAVITY UA: >=1.03 (ref 1–1.03)
UROBILINOGEN, URINE: 2 E.U./DL
WBC UA: NORMAL /HPF (ref 0–5)

## 2022-09-30 PROCEDURE — 6360000002 HC RX W HCPCS: Performed by: NURSE PRACTITIONER

## 2022-09-30 PROCEDURE — 86592 SYPHILIS TEST NON-TREP QUAL: CPT

## 2022-09-30 PROCEDURE — 87529 HSV DNA AMP PROBE: CPT

## 2022-09-30 PROCEDURE — 87591 N.GONORRHOEAE DNA AMP PROB: CPT

## 2022-09-30 PROCEDURE — 87491 CHLMYD TRACH DNA AMP PROBE: CPT

## 2022-09-30 PROCEDURE — 2500000003 HC RX 250 WO HCPCS: Performed by: NURSE PRACTITIONER

## 2022-09-30 PROCEDURE — 96372 THER/PROPH/DIAG INJ SC/IM: CPT

## 2022-09-30 PROCEDURE — 99284 EMERGENCY DEPT VISIT MOD MDM: CPT

## 2022-09-30 PROCEDURE — 6370000000 HC RX 637 (ALT 250 FOR IP): Performed by: NURSE PRACTITIONER

## 2022-09-30 PROCEDURE — 87088 URINE BACTERIA CULTURE: CPT

## 2022-09-30 PROCEDURE — 81001 URINALYSIS AUTO W/SCOPE: CPT

## 2022-09-30 RX ORDER — ONDANSETRON 4 MG/1
4 TABLET, ORALLY DISINTEGRATING ORAL ONCE
Status: COMPLETED | OUTPATIENT
Start: 2022-09-30 | End: 2022-09-30

## 2022-09-30 RX ORDER — METRONIDAZOLE 500 MG/1
2000 TABLET ORAL ONCE
Status: COMPLETED | OUTPATIENT
Start: 2022-09-30 | End: 2022-09-30

## 2022-09-30 RX ORDER — VALACYCLOVIR HYDROCHLORIDE 1 G/1
1000 TABLET, FILM COATED ORAL 2 TIMES DAILY
Qty: 14 TABLET | Refills: 0 | Status: SHIPPED | OUTPATIENT
Start: 2022-09-30 | End: 2022-10-07

## 2022-09-30 RX ORDER — AZITHROMYCIN 250 MG/1
1000 TABLET, FILM COATED ORAL ONCE
Status: COMPLETED | OUTPATIENT
Start: 2022-09-30 | End: 2022-09-30

## 2022-09-30 RX ADMIN — LIDOCAINE HYDROCHLORIDE 1000 MG: 10 INJECTION, SOLUTION EPIDURAL; INFILTRATION; INTRACAUDAL; PERINEURAL at 02:20

## 2022-09-30 RX ADMIN — ONDANSETRON 4 MG: 4 TABLET, ORALLY DISINTEGRATING ORAL at 01:38

## 2022-09-30 RX ADMIN — AZITHROMYCIN 1000 MG: 250 TABLET, FILM COATED ORAL at 01:37

## 2022-09-30 RX ADMIN — METRONIDAZOLE 2000 MG: 500 TABLET ORAL at 01:38

## 2022-09-30 ASSESSMENT — PAIN - FUNCTIONAL ASSESSMENT
PAIN_FUNCTIONAL_ASSESSMENT: NONE - DENIES PAIN
PAIN_FUNCTIONAL_ASSESSMENT: NONE - DENIES PAIN

## 2022-09-30 NOTE — ED PROVIDER NOTES
[address]  Department of Emergency Medicine   ED  Encounter Note  Admit Date/RoomTime: No admission date for patient encounter. ED Room: Room/bed info not found    NAME: Rosio Knight  : 2000  MRN: [id number]     Chief Complaint:  Dysuria (Wants STD check)    History of Present Illness      Jack Wright III is a 23 y.o. old male who presents to the emergency department by private vehicle, for possible exposure to Prior treatment for sexual transmitted diseases with dysuria and burning on urination, which occured Tuesday 3 day(s) prior to arrival.  Since exposure/onset there has been worsening of symptoms. He denies any genital discharge, genital ulcers, scrotal mass, scrotal pain, fever, abdominal pain, back pain, urinary frequency, or hematuria. His prior STD history: Gonorrhea. His partner has similar symptoms. ROS   Pertinent positives and negatives are stated within HPI, all other systems reviewed and are negative. Past Medical History:  has a past medical history of Benign focal epilepsy of childhood ([facility] [address] Surgical History:  has no past surgical history on file. Social History:  reports that he has never smoked. He has never used smokeless tobacco. He reports that he does not drink alcohol and does not use drugs. Family History: family history includes Cancer in his maternal aunt and maternal grandfather; Diabetes in his paternal grandfather; Heart Disease in his paternal grandfather; Hypertension in his paternal grandfather; Liver Disease in his mother; Sickle Cell Anemia in his maternal grandfather; Sickle Cell Trait in his maternal aunt.      Allergies: Bee venom and Desonide    Physical Exam   Oxygen Saturation Interpretation: Normal.        ED Triage Vitals [22 0034]   BP Temp Temp Source Heart Rate Resp SpO2 Height Weight   136/82 98.3 °F (36.8 °C) Temporal 86 15 99 % 6' 1\" (1.854 m) 240 lb (108.9 kg)         Constitutional: Alert, development consistent with age. HEENT:  NC/NT. Airway patent  Neck:  Normal ROM. Supple. Respiratory:  Lungs Clear to auscultation and breath sounds equal.  CV:  Regular rate and rhythm, normal heart sounds, without pathological murmurs, ectopy, gallops, or rubs. GI:  AbdomenSoft, nontender, good bowel sounds. No firm or pulsatile mass. : Genitalia Exam: (Same sex / third party chaperone present during examination) [id number] Hinds RN present during examination. Penis: non focal circumcised. Cluster of fluid filled vesicular lesions on bilateral shaft of penis. Scrotum: normal.       Testicle: normal without masses bilateral.  Integument:  Normal turgor. Warm, dry, without visible rash, unless noted elsewhere. Lymphatics: No lymphangitis or adenopathy noted. Neurological:  Orientation age-appropriate. Motor functions intact. Lab / Imaging Results   (All laboratory and radiology results have been personally reviewed by myself)  Labs:  Results for orders placed or performed during the hospital encounter of 09/30/22   C.trachomatis N.gonorrhoeae DNA, Urine    Specimen: Urine   Result Value Ref Range    Source Urine    Urinalysis   Result Value Ref Range    Color, UA Yellow Straw/Yellow    Clarity, UA Clear Clear    Glucose, Ur Negative Negative mg/dL    Bilirubin Urine SMALL (A) Negative    Ketones, Urine TRACE (A) Negative mg/dL    Specific Gravity, UA >=1.030 1.005 - 1.030    Blood, Urine Negative Negative    pH, UA 6.0 5.0 - 9.0    Protein, UA TRACE Negative mg/dL    Urobilinogen, Urine 2.0 (A) <2.0 E.U./dL    Nitrite, Urine Negative Negative    Leukocyte Esterase, Urine Negative Negative   Microscopic Urinalysis   Result Value Ref Range    WBC, UA NONE 0 - 5 /HPF    RBC, UA NONE 0 - 2 /HPF    Bacteria, UA NONE SEEN None Seen /HPF     Imaging: All Radiology results interpreted by Radiologist unless otherwise noted.   No orders to display     ED Course / Medical Decision Making Medications   azithromycin (ZITHROMAX) tablet 1,000 mg (1,000 mg Oral Given 9/30/22 0137)   ondansetron (ZOFRAN-ODT) disintegrating tablet 4 mg (4 mg Oral Given 9/30/22 0138)   metroNIDAZOLE (FLAGYL) tablet 2,000 mg (2,000 mg Oral Given 9/30/22 0138)   cefTRIAXone (ROCEPHIN) 1,000 mg in lidocaine 1 % 2.86 mL IM Injection (1,000 mg IntraMUSCular Given 9/30/22 0220)        Consult(s):   None    Procedure(s):   None    Medical Decision Making:    Plan to obtain cultures and treat for suspected STD and provide outpatient follow-up instructions. Benign abdominal examination no flank tenderness. Urinalysis was negative for UTI. Genital exam revealed several clusters of vesicular serous fluid-filled lesions consistent with genital herpes;  however,  he is saying they are not painful and will additionally complete an RPR test and will treat with Valtrex since partners lesions are painful and similar. Culture was sent for herpes additionally. Patient advised on safe sex practices and partner notification. Patient also advised to give a phone number that he can be reached if the syphilis testing is positive he will need to be treated further and was made aware of this and was advised abstinence until his results are good. He was additionally given [facility] access to follow-up with the PCP. He is afebrile, nontoxic in appearance dynamically stable discharge. Advised on signs and symptoms warranting immediate return to the ED for reevaluation at any time. Plan of Care/Counseling:  MARIELENA Mckee CNP reviewed today's visit with the patient in addition to providing specific details for the plan of care and counseling regarding the diagnosis and prognosis. Questions are answered at this time and are agreeable with the plan. Assessment     1. Dysuria    2. Rash of penis      Plan   Discharged home.   Patient condition is good    New Medications     New Prescriptions    VALACYCLOVIR (VALTREX) 1 G TABLET Take 1 tablet by mouth 2 times daily for 7 days     Electronically signed by MARIELENA Maya CNP   DD: 9/30/22  **This report was transcribed using voice recognition software. Every effort was made to ensure accuracy; however, inadvertent computerized transcription errors may be present.   END OF ED PROVIDER NOTE      MARIELENA Maya CNP  09/30/22 0855

## 2022-10-02 LAB — URINE CULTURE, ROUTINE: NORMAL

## 2022-10-03 LAB
C. TRACHOMATIS DNA ,URINE: NEGATIVE
N. GONORRHOEAE DNA, URINE: NEGATIVE
RPR: NORMAL
SOURCE: NORMAL

## 2024-05-02 ENCOUNTER — APPOINTMENT (OUTPATIENT)
Dept: ULTRASOUND IMAGING | Age: 24
DRG: 352 | End: 2024-05-02

## 2024-05-02 ENCOUNTER — HOSPITAL ENCOUNTER (INPATIENT)
Age: 24
LOS: 1 days | Discharge: HOME | DRG: 352 | End: 2024-05-04
Attending: EMERGENCY MEDICINE | Admitting: SURGERY

## 2024-05-02 ENCOUNTER — APPOINTMENT (OUTPATIENT)
Dept: CT IMAGING | Age: 24
DRG: 352 | End: 2024-05-02

## 2024-05-02 DIAGNOSIS — K46.0 HERNIA WITH STRANGULATION: Primary | ICD-10-CM

## 2024-05-02 LAB
ALBUMIN SERPL-MCNC: 4.5 G/DL (ref 3.5–5.2)
ALP SERPL-CCNC: 58 U/L (ref 40–129)
ALT SERPL-CCNC: 19 U/L (ref 0–40)
ANION GAP SERPL CALCULATED.3IONS-SCNC: 13 MMOL/L (ref 7–16)
AST SERPL-CCNC: 17 U/L (ref 0–39)
BACTERIA URNS QL MICRO: ABNORMAL
BASOPHILS # BLD: 0.06 K/UL (ref 0–0.2)
BASOPHILS NFR BLD: 1 % (ref 0–2)
BILIRUB SERPL-MCNC: 0.5 MG/DL (ref 0–1.2)
BILIRUB UR QL STRIP: NEGATIVE
BUN SERPL-MCNC: 13 MG/DL (ref 6–20)
CALCIUM SERPL-MCNC: 9.3 MG/DL (ref 8.6–10.2)
CHLORIDE SERPL-SCNC: 101 MMOL/L (ref 98–107)
CLARITY UR: CLEAR
CO2 SERPL-SCNC: 26 MMOL/L (ref 22–29)
COLOR UR: YELLOW
CREAT SERPL-MCNC: 1 MG/DL (ref 0.7–1.2)
EOSINOPHIL # BLD: 0.09 K/UL (ref 0.05–0.5)
EOSINOPHILS RELATIVE PERCENT: 2 % (ref 0–6)
ERYTHROCYTE [DISTWIDTH] IN BLOOD BY AUTOMATED COUNT: 19.8 % (ref 11.5–15)
GFR, ESTIMATED: >90 ML/MIN/1.73M2
GLUCOSE SERPL-MCNC: 105 MG/DL (ref 74–99)
GLUCOSE UR STRIP-MCNC: NEGATIVE MG/DL
HCT VFR BLD AUTO: 40.8 % (ref 37–54)
HGB BLD-MCNC: 12.7 G/DL (ref 12.5–16.5)
HGB UR QL STRIP.AUTO: NEGATIVE
IMM GRANULOCYTES # BLD AUTO: <0.03 K/UL (ref 0–0.58)
IMM GRANULOCYTES NFR BLD: 0 % (ref 0–5)
KETONES UR STRIP-MCNC: NEGATIVE MG/DL
LACTATE BLDV-SCNC: 1.5 MMOL/L (ref 0.5–2.2)
LEUKOCYTE ESTERASE UR QL STRIP: NEGATIVE
LYMPHOCYTES NFR BLD: 2.26 K/UL (ref 1.5–4)
LYMPHOCYTES RELATIVE PERCENT: 44 % (ref 20–42)
MCH RBC QN AUTO: 21.6 PG (ref 26–35)
MCHC RBC AUTO-ENTMCNC: 31.1 G/DL (ref 32–34.5)
MCV RBC AUTO: 69.3 FL (ref 80–99.9)
MONOCYTES NFR BLD: 0.6 K/UL (ref 0.1–0.95)
MONOCYTES NFR BLD: 12 % (ref 2–12)
NEUTROPHILS NFR BLD: 41 % (ref 43–80)
NEUTS SEG NFR BLD: 2.08 K/UL (ref 1.8–7.3)
NITRITE UR QL STRIP: NEGATIVE
PH UR STRIP: 6 [PH] (ref 5–9)
PLATELET # BLD AUTO: 464 K/UL (ref 130–450)
PMV BLD AUTO: 10.6 FL (ref 7–12)
POTASSIUM SERPL-SCNC: 3.7 MMOL/L (ref 3.5–5)
PROT SERPL-MCNC: 7.9 G/DL (ref 6.4–8.3)
PROT UR STRIP-MCNC: NEGATIVE MG/DL
RBC # BLD AUTO: 5.89 M/UL (ref 3.8–5.8)
RBC #/AREA URNS HPF: ABNORMAL /HPF
SODIUM SERPL-SCNC: 140 MMOL/L (ref 132–146)
SP GR UR STRIP: 1.01 (ref 1–1.03)
UROBILINOGEN UR STRIP-ACNC: 0.2 EU/DL (ref 0–1)
WBC #/AREA URNS HPF: ABNORMAL /HPF
WBC OTHER # BLD: 5.1 K/UL (ref 4.5–11.5)

## 2024-05-02 PROCEDURE — 2500000003 HC RX 250 WO HCPCS

## 2024-05-02 PROCEDURE — 76870 US EXAM SCROTUM: CPT

## 2024-05-02 PROCEDURE — 83605 ASSAY OF LACTIC ACID: CPT

## 2024-05-02 PROCEDURE — 74177 CT ABD & PELVIS W/CONTRAST: CPT

## 2024-05-02 PROCEDURE — 6360000004 HC RX CONTRAST MEDICATION: Performed by: RADIOLOGY

## 2024-05-02 PROCEDURE — 80053 COMPREHEN METABOLIC PANEL: CPT

## 2024-05-02 PROCEDURE — 81001 URINALYSIS AUTO W/SCOPE: CPT

## 2024-05-02 PROCEDURE — 99285 EMERGENCY DEPT VISIT HI MDM: CPT

## 2024-05-02 PROCEDURE — 85025 COMPLETE CBC W/AUTO DIFF WBC: CPT

## 2024-05-02 PROCEDURE — 96372 THER/PROPH/DIAG INJ SC/IM: CPT

## 2024-05-02 RX ORDER — HYDROMORPHONE HYDROCHLORIDE 1 MG/ML
1 INJECTION, SOLUTION INTRAMUSCULAR; INTRAVENOUS; SUBCUTANEOUS ONCE
Status: DISCONTINUED | OUTPATIENT
Start: 2024-05-02 | End: 2024-05-02

## 2024-05-02 RX ORDER — KETOROLAC TROMETHAMINE 30 MG/ML
15 INJECTION, SOLUTION INTRAMUSCULAR; INTRAVENOUS ONCE
Status: DISCONTINUED | OUTPATIENT
Start: 2024-05-02 | End: 2024-05-02

## 2024-05-02 RX ORDER — HYDROMORPHONE HYDROCHLORIDE 1 MG/ML
1 INJECTION, SOLUTION INTRAMUSCULAR; INTRAVENOUS; SUBCUTANEOUS ONCE
Status: COMPLETED | OUTPATIENT
Start: 2024-05-02 | End: 2024-05-02

## 2024-05-02 RX ADMIN — HYDROMORPHONE HYDROCHLORIDE 1 MG: 1 INJECTION, SOLUTION INTRAMUSCULAR; INTRAVENOUS; SUBCUTANEOUS at 23:53

## 2024-05-02 RX ADMIN — IOPAMIDOL 75 ML: 755 INJECTION, SOLUTION INTRAVENOUS at 22:03

## 2024-05-02 ASSESSMENT — PAIN DESCRIPTION - LOCATION: LOCATION: ABDOMEN

## 2024-05-02 ASSESSMENT — PAIN DESCRIPTION - ORIENTATION: ORIENTATION: LEFT;LOWER

## 2024-05-02 ASSESSMENT — LIFESTYLE VARIABLES
HOW OFTEN DO YOU HAVE A DRINK CONTAINING ALCOHOL: NEVER
HOW MANY STANDARD DRINKS CONTAINING ALCOHOL DO YOU HAVE ON A TYPICAL DAY: PATIENT DOES NOT DRINK

## 2024-05-02 ASSESSMENT — PAIN SCALES - GENERAL
PAINLEVEL_OUTOF10: 9
PAINLEVEL_OUTOF10: 10

## 2024-05-02 ASSESSMENT — PAIN DESCRIPTION - DESCRIPTORS: DESCRIPTORS: SHARP

## 2024-05-02 ASSESSMENT — PAIN - FUNCTIONAL ASSESSMENT: PAIN_FUNCTIONAL_ASSESSMENT: 0-10

## 2024-05-03 ENCOUNTER — ANESTHESIA EVENT (OUTPATIENT)
Dept: OPERATING ROOM | Age: 24
End: 2024-05-03

## 2024-05-03 ENCOUNTER — ANESTHESIA (OUTPATIENT)
Dept: OPERATING ROOM | Age: 24
End: 2024-05-03

## 2024-05-03 PROBLEM — K46.0 HERNIA WITH STRANGULATION: Status: ACTIVE | Noted: 2024-05-03

## 2024-05-03 PROBLEM — K40.90 LEFT INGUINAL HERNIA: Status: ACTIVE | Noted: 2024-05-03

## 2024-05-03 LAB
ABO + RH BLD: NORMAL
ANION GAP SERPL CALCULATED.3IONS-SCNC: 10 MMOL/L (ref 7–16)
ARM BAND NUMBER: NORMAL
BASOPHILS # BLD: 0.05 K/UL (ref 0–0.2)
BASOPHILS NFR BLD: 1 % (ref 0–2)
BLOOD BANK SAMPLE EXPIRATION: NORMAL
BLOOD GROUP ANTIBODIES SERPL: NEGATIVE
BUN SERPL-MCNC: 11 MG/DL (ref 6–20)
CALCIUM SERPL-MCNC: 9.3 MG/DL (ref 8.6–10.2)
CHLORIDE SERPL-SCNC: 103 MMOL/L (ref 98–107)
CO2 SERPL-SCNC: 28 MMOL/L (ref 22–29)
CREAT SERPL-MCNC: 0.9 MG/DL (ref 0.7–1.2)
EOSINOPHIL # BLD: 0.06 K/UL (ref 0.05–0.5)
EOSINOPHILS RELATIVE PERCENT: 1 % (ref 0–6)
ERYTHROCYTE [DISTWIDTH] IN BLOOD BY AUTOMATED COUNT: 19.8 % (ref 11.5–15)
GFR, ESTIMATED: >90 ML/MIN/1.73M2
GLUCOSE SERPL-MCNC: 89 MG/DL (ref 74–99)
HCT VFR BLD AUTO: 39.5 % (ref 37–54)
HGB BLD-MCNC: 12.3 G/DL (ref 12.5–16.5)
IMM GRANULOCYTES # BLD AUTO: <0.03 K/UL (ref 0–0.58)
IMM GRANULOCYTES NFR BLD: 0 % (ref 0–5)
INR PPP: 1.3
LYMPHOCYTES NFR BLD: 2.76 K/UL (ref 1.5–4)
LYMPHOCYTES RELATIVE PERCENT: 41 % (ref 20–42)
MCH RBC QN AUTO: 21.7 PG (ref 26–35)
MCHC RBC AUTO-ENTMCNC: 31.1 G/DL (ref 32–34.5)
MCV RBC AUTO: 69.7 FL (ref 80–99.9)
MONOCYTES NFR BLD: 0.68 K/UL (ref 0.1–0.95)
MONOCYTES NFR BLD: 10 % (ref 2–12)
NEUTROPHILS NFR BLD: 47 % (ref 43–80)
NEUTS SEG NFR BLD: 3.18 K/UL (ref 1.8–7.3)
PLATELET # BLD AUTO: 463 K/UL (ref 130–450)
PMV BLD AUTO: 10.7 FL (ref 7–12)
POTASSIUM SERPL-SCNC: 3.9 MMOL/L (ref 3.5–5)
PROTHROMBIN TIME: 14 SEC (ref 9.3–12.4)
RBC # BLD AUTO: 5.67 M/UL (ref 3.8–5.8)
SODIUM SERPL-SCNC: 141 MMOL/L (ref 132–146)
WBC OTHER # BLD: 6.8 K/UL (ref 4.5–11.5)

## 2024-05-03 PROCEDURE — 2580000003 HC RX 258

## 2024-05-03 PROCEDURE — C1781 MESH (IMPLANTABLE): HCPCS | Performed by: SURGERY

## 2024-05-03 PROCEDURE — 85610 PROTHROMBIN TIME: CPT

## 2024-05-03 PROCEDURE — 6370000000 HC RX 637 (ALT 250 FOR IP)

## 2024-05-03 PROCEDURE — 86900 BLOOD TYPING SEROLOGIC ABO: CPT

## 2024-05-03 PROCEDURE — 3700000000 HC ANESTHESIA ATTENDED CARE: Performed by: SURGERY

## 2024-05-03 PROCEDURE — 36415 COLL VENOUS BLD VENIPUNCTURE: CPT

## 2024-05-03 PROCEDURE — 3600000017 HC SURGERY HYBRID ADDL 15MIN: Performed by: SURGERY

## 2024-05-03 PROCEDURE — 8E0W4CZ ROBOTIC ASSISTED PROCEDURE OF TRUNK REGION, PERCUTANEOUS ENDOSCOPIC APPROACH: ICD-10-PCS | Performed by: SURGERY

## 2024-05-03 PROCEDURE — 99223 1ST HOSP IP/OBS HIGH 75: CPT | Performed by: SURGERY

## 2024-05-03 PROCEDURE — 86850 RBC ANTIBODY SCREEN: CPT

## 2024-05-03 PROCEDURE — 7100000000 HC PACU RECOVERY - FIRST 15 MIN: Performed by: SURGERY

## 2024-05-03 PROCEDURE — 2500000003 HC RX 250 WO HCPCS

## 2024-05-03 PROCEDURE — 86901 BLOOD TYPING SEROLOGIC RH(D): CPT

## 2024-05-03 PROCEDURE — 2709999900 HC NON-CHARGEABLE SUPPLY: Performed by: SURGERY

## 2024-05-03 PROCEDURE — 3600000007 HC SURGERY HYBRID BASE: Performed by: SURGERY

## 2024-05-03 PROCEDURE — 6360000002 HC RX W HCPCS

## 2024-05-03 PROCEDURE — 80048 BASIC METABOLIC PNL TOTAL CA: CPT

## 2024-05-03 PROCEDURE — 3700000001 HC ADD 15 MINUTES (ANESTHESIA): Performed by: SURGERY

## 2024-05-03 PROCEDURE — 7100000001 HC PACU RECOVERY - ADDTL 15 MIN: Performed by: SURGERY

## 2024-05-03 PROCEDURE — 0YU60JZ SUPPLEMENT LEFT INGUINAL REGION WITH SYNTHETIC SUBSTITUTE, OPEN APPROACH: ICD-10-PCS | Performed by: SURGERY

## 2024-05-03 PROCEDURE — 6360000002 HC RX W HCPCS: Performed by: SURGERY

## 2024-05-03 PROCEDURE — 1200000000 HC SEMI PRIVATE

## 2024-05-03 PROCEDURE — 85025 COMPLETE CBC W/AUTO DIFF WBC: CPT

## 2024-05-03 DEVICE — LAPAROSCOPIC SELF-FIXATING MESH POLYESTER WITH POLYLACTIC ACID GRIPS AND COLLAGEN FILM
Type: IMPLANTABLE DEVICE | Site: ABDOMEN | Status: FUNCTIONAL
Brand: PROGRIP

## 2024-05-03 RX ORDER — METHOCARBAMOL 750 MG/1
750 TABLET, FILM COATED ORAL 4 TIMES DAILY
Qty: 40 TABLET | Refills: 0 | Status: SHIPPED | OUTPATIENT
Start: 2024-05-03 | End: 2024-05-04 | Stop reason: HOSPADM

## 2024-05-03 RX ORDER — OXYCODONE HYDROCHLORIDE 10 MG/1
10 TABLET ORAL EVERY 4 HOURS PRN
Status: DISCONTINUED | OUTPATIENT
Start: 2024-05-03 | End: 2024-05-04 | Stop reason: HOSPADM

## 2024-05-03 RX ORDER — SODIUM CHLORIDE 9 MG/ML
INJECTION, SOLUTION INTRAVENOUS CONTINUOUS PRN
Status: DISCONTINUED | OUTPATIENT
Start: 2024-05-03 | End: 2024-05-03 | Stop reason: SDUPTHER

## 2024-05-03 RX ORDER — SODIUM CHLORIDE 9 MG/ML
INJECTION, SOLUTION INTRAVENOUS PRN
Status: DISCONTINUED | OUTPATIENT
Start: 2024-05-03 | End: 2024-05-04 | Stop reason: HOSPADM

## 2024-05-03 RX ORDER — OXYCODONE HYDROCHLORIDE 5 MG/1
5 TABLET ORAL EVERY 6 HOURS PRN
Qty: 20 TABLET | Refills: 0 | Status: SHIPPED | OUTPATIENT
Start: 2024-05-03 | End: 2024-05-08

## 2024-05-03 RX ORDER — FENTANYL CITRATE 50 UG/ML
INJECTION, SOLUTION INTRAMUSCULAR; INTRAVENOUS PRN
Status: DISCONTINUED | OUTPATIENT
Start: 2024-05-03 | End: 2024-05-03 | Stop reason: SDUPTHER

## 2024-05-03 RX ORDER — NALOXONE HYDROCHLORIDE 0.4 MG/ML
INJECTION, SOLUTION INTRAMUSCULAR; INTRAVENOUS; SUBCUTANEOUS PRN
Status: DISCONTINUED | OUTPATIENT
Start: 2024-05-03 | End: 2024-05-04 | Stop reason: HOSPADM

## 2024-05-03 RX ORDER — MEPERIDINE HYDROCHLORIDE 25 MG/ML
12.5 INJECTION INTRAMUSCULAR; INTRAVENOUS; SUBCUTANEOUS
Status: DISCONTINUED | OUTPATIENT
Start: 2024-05-03 | End: 2024-05-04 | Stop reason: HOSPADM

## 2024-05-03 RX ORDER — SODIUM CHLORIDE 0.9 % (FLUSH) 0.9 %
5-40 SYRINGE (ML) INJECTION EVERY 12 HOURS SCHEDULED
Status: DISCONTINUED | OUTPATIENT
Start: 2024-05-03 | End: 2024-05-04 | Stop reason: HOSPADM

## 2024-05-03 RX ORDER — PROPOFOL 10 MG/ML
INJECTION, EMULSION INTRAVENOUS PRN
Status: DISCONTINUED | OUTPATIENT
Start: 2024-05-03 | End: 2024-05-03 | Stop reason: SDUPTHER

## 2024-05-03 RX ORDER — SODIUM CHLORIDE 0.9 % (FLUSH) 0.9 %
10 SYRINGE (ML) INJECTION EVERY 12 HOURS SCHEDULED
Status: DISCONTINUED | OUTPATIENT
Start: 2024-05-03 | End: 2024-05-04 | Stop reason: HOSPADM

## 2024-05-03 RX ORDER — LIDOCAINE HYDROCHLORIDE 20 MG/ML
INJECTION, SOLUTION INTRAVENOUS PRN
Status: DISCONTINUED | OUTPATIENT
Start: 2024-05-03 | End: 2024-05-03 | Stop reason: SDUPTHER

## 2024-05-03 RX ORDER — ONDANSETRON 2 MG/ML
INJECTION INTRAMUSCULAR; INTRAVENOUS PRN
Status: DISCONTINUED | OUTPATIENT
Start: 2024-05-03 | End: 2024-05-03 | Stop reason: SDUPTHER

## 2024-05-03 RX ORDER — ONDANSETRON 2 MG/ML
4 INJECTION INTRAMUSCULAR; INTRAVENOUS
Status: DISCONTINUED | OUTPATIENT
Start: 2024-05-03 | End: 2024-05-04 | Stop reason: HOSPADM

## 2024-05-03 RX ORDER — MIDAZOLAM HYDROCHLORIDE 1 MG/ML
INJECTION INTRAMUSCULAR; INTRAVENOUS PRN
Status: DISCONTINUED | OUTPATIENT
Start: 2024-05-03 | End: 2024-05-03 | Stop reason: SDUPTHER

## 2024-05-03 RX ORDER — OXYCODONE HYDROCHLORIDE 5 MG/1
5 TABLET ORAL EVERY 4 HOURS PRN
Status: DISCONTINUED | OUTPATIENT
Start: 2024-05-03 | End: 2024-05-04 | Stop reason: HOSPADM

## 2024-05-03 RX ORDER — SODIUM CHLORIDE 0.9 % (FLUSH) 0.9 %
5-40 SYRINGE (ML) INJECTION PRN
Status: DISCONTINUED | OUTPATIENT
Start: 2024-05-03 | End: 2024-05-04 | Stop reason: HOSPADM

## 2024-05-03 RX ORDER — ONDANSETRON 2 MG/ML
4 INJECTION INTRAMUSCULAR; INTRAVENOUS EVERY 6 HOURS PRN
Status: DISCONTINUED | OUTPATIENT
Start: 2024-05-03 | End: 2024-05-04 | Stop reason: HOSPADM

## 2024-05-03 RX ORDER — KETOROLAC TROMETHAMINE 30 MG/ML
INJECTION, SOLUTION INTRAMUSCULAR; INTRAVENOUS PRN
Status: DISCONTINUED | OUTPATIENT
Start: 2024-05-03 | End: 2024-05-03 | Stop reason: SDUPTHER

## 2024-05-03 RX ORDER — BUPIVACAINE HYDROCHLORIDE 5 MG/ML
INJECTION, SOLUTION EPIDURAL; INTRACAUDAL PRN
Status: DISCONTINUED | OUTPATIENT
Start: 2024-05-03 | End: 2024-05-03 | Stop reason: ALTCHOICE

## 2024-05-03 RX ORDER — CEFAZOLIN SODIUM 1 G/3ML
INJECTION, POWDER, FOR SOLUTION INTRAMUSCULAR; INTRAVENOUS PRN
Status: DISCONTINUED | OUTPATIENT
Start: 2024-05-03 | End: 2024-05-03 | Stop reason: SDUPTHER

## 2024-05-03 RX ORDER — HYDROMORPHONE HYDROCHLORIDE 1 MG/ML
0.5 INJECTION, SOLUTION INTRAMUSCULAR; INTRAVENOUS; SUBCUTANEOUS EVERY 5 MIN PRN
Status: DISCONTINUED | OUTPATIENT
Start: 2024-05-03 | End: 2024-05-04 | Stop reason: HOSPADM

## 2024-05-03 RX ORDER — SODIUM CHLORIDE, SODIUM LACTATE, POTASSIUM CHLORIDE, CALCIUM CHLORIDE 600; 310; 30; 20 MG/100ML; MG/100ML; MG/100ML; MG/100ML
INJECTION, SOLUTION INTRAVENOUS CONTINUOUS
Status: DISCONTINUED | OUTPATIENT
Start: 2024-05-03 | End: 2024-05-04

## 2024-05-03 RX ORDER — ACETAMINOPHEN 160 MG/5ML
650 LIQUID ORAL EVERY 6 HOURS
Status: DISCONTINUED | OUTPATIENT
Start: 2024-05-03 | End: 2024-05-04 | Stop reason: HOSPADM

## 2024-05-03 RX ORDER — ONDANSETRON 4 MG/1
4 TABLET, ORALLY DISINTEGRATING ORAL EVERY 8 HOURS PRN
Status: DISCONTINUED | OUTPATIENT
Start: 2024-05-03 | End: 2024-05-04 | Stop reason: HOSPADM

## 2024-05-03 RX ORDER — SODIUM CHLORIDE 0.9 % (FLUSH) 0.9 %
10 SYRINGE (ML) INJECTION PRN
Status: DISCONTINUED | OUTPATIENT
Start: 2024-05-03 | End: 2024-05-04 | Stop reason: HOSPADM

## 2024-05-03 RX ORDER — DEXAMETHASONE SODIUM PHOSPHATE 10 MG/ML
INJECTION INTRAMUSCULAR; INTRAVENOUS PRN
Status: DISCONTINUED | OUTPATIENT
Start: 2024-05-03 | End: 2024-05-03 | Stop reason: SDUPTHER

## 2024-05-03 RX ORDER — HYDROMORPHONE HYDROCHLORIDE 1 MG/ML
0.25 INJECTION, SOLUTION INTRAMUSCULAR; INTRAVENOUS; SUBCUTANEOUS EVERY 5 MIN PRN
Status: DISCONTINUED | OUTPATIENT
Start: 2024-05-03 | End: 2024-05-04 | Stop reason: HOSPADM

## 2024-05-03 RX ORDER — ROCURONIUM BROMIDE 10 MG/ML
INJECTION, SOLUTION INTRAVENOUS PRN
Status: DISCONTINUED | OUTPATIENT
Start: 2024-05-03 | End: 2024-05-03 | Stop reason: SDUPTHER

## 2024-05-03 RX ORDER — ENOXAPARIN SODIUM 100 MG/ML
40 INJECTION SUBCUTANEOUS DAILY
Status: DISCONTINUED | OUTPATIENT
Start: 2024-05-03 | End: 2024-05-04

## 2024-05-03 RX ADMIN — KETOROLAC TROMETHAMINE 30 MG: 30 INJECTION INTRAMUSCULAR; INTRAVENOUS at 22:49

## 2024-05-03 RX ADMIN — ONDANSETRON 4 MG: 2 INJECTION INTRAMUSCULAR; INTRAVENOUS at 22:49

## 2024-05-03 RX ADMIN — PROPOFOL 200 MG: 10 INJECTION, EMULSION INTRAVENOUS at 19:38

## 2024-05-03 RX ADMIN — ROCURONIUM BROMIDE 50 MG: 10 INJECTION, SOLUTION INTRAVENOUS at 19:38

## 2024-05-03 RX ADMIN — FENTANYL CITRATE 50 MCG: 0.05 INJECTION, SOLUTION INTRAMUSCULAR; INTRAVENOUS at 20:03

## 2024-05-03 RX ADMIN — FENTANYL CITRATE 50 MCG: 0.05 INJECTION, SOLUTION INTRAMUSCULAR; INTRAVENOUS at 22:31

## 2024-05-03 RX ADMIN — ROCURONIUM BROMIDE 20 MG: 10 INJECTION, SOLUTION INTRAVENOUS at 21:47

## 2024-05-03 RX ADMIN — DEXAMETHASONE SODIUM PHOSPHATE 10 MG: 10 INJECTION INTRAMUSCULAR; INTRAVENOUS at 19:42

## 2024-05-03 RX ADMIN — ROCURONIUM BROMIDE 20 MG: 10 INJECTION, SOLUTION INTRAVENOUS at 19:57

## 2024-05-03 RX ADMIN — FENTANYL CITRATE 50 MCG: 0.05 INJECTION, SOLUTION INTRAMUSCULAR; INTRAVENOUS at 21:47

## 2024-05-03 RX ADMIN — SODIUM CHLORIDE, POTASSIUM CHLORIDE, SODIUM LACTATE AND CALCIUM CHLORIDE: 600; 310; 30; 20 INJECTION, SOLUTION INTRAVENOUS at 02:10

## 2024-05-03 RX ADMIN — MIDAZOLAM 2 MG: 1 INJECTION INTRAMUSCULAR; INTRAVENOUS at 19:29

## 2024-05-03 RX ADMIN — LIDOCAINE HYDROCHLORIDE 100 MG: 20 INJECTION, SOLUTION INTRAVENOUS at 19:38

## 2024-05-03 RX ADMIN — FENTANYL CITRATE 50 MCG: 0.05 INJECTION, SOLUTION INTRAMUSCULAR; INTRAVENOUS at 19:50

## 2024-05-03 RX ADMIN — ROCURONIUM BROMIDE 20 MG: 10 INJECTION, SOLUTION INTRAVENOUS at 20:57

## 2024-05-03 RX ADMIN — FENTANYL CITRATE 50 MCG: 0.05 INJECTION, SOLUTION INTRAMUSCULAR; INTRAVENOUS at 22:01

## 2024-05-03 RX ADMIN — ACETAMINOPHEN 650 MG: 650 SOLUTION ORAL at 05:10

## 2024-05-03 RX ADMIN — FENTANYL CITRATE 50 MCG: 0.05 INJECTION, SOLUTION INTRAMUSCULAR; INTRAVENOUS at 20:24

## 2024-05-03 RX ADMIN — SUGAMMADEX 400 MG: 100 INJECTION, SOLUTION INTRAVENOUS at 22:57

## 2024-05-03 RX ADMIN — CEFAZOLIN 2 G: 1 INJECTION, POWDER, FOR SOLUTION INTRAMUSCULAR; INTRAVENOUS at 19:44

## 2024-05-03 RX ADMIN — FENTANYL CITRATE 50 MCG: 0.05 INJECTION, SOLUTION INTRAMUSCULAR; INTRAVENOUS at 20:27

## 2024-05-03 RX ADMIN — SODIUM CHLORIDE, POTASSIUM CHLORIDE, SODIUM LACTATE AND CALCIUM CHLORIDE: 600; 310; 30; 20 INJECTION, SOLUTION INTRAVENOUS at 09:17

## 2024-05-03 RX ADMIN — SODIUM CHLORIDE: 0.9 INJECTION, SOLUTION INTRAVENOUS at 19:29

## 2024-05-03 RX ADMIN — SODIUM CHLORIDE: 0.9 INJECTION, SOLUTION INTRAVENOUS at 21:43

## 2024-05-03 RX ADMIN — OXYCODONE HYDROCHLORIDE 10 MG: 10 TABLET ORAL at 10:22

## 2024-05-03 RX ADMIN — FENTANYL CITRATE 100 MCG: 0.05 INJECTION, SOLUTION INTRAMUSCULAR; INTRAVENOUS at 19:38

## 2024-05-03 RX ADMIN — ROCURONIUM BROMIDE 10 MG: 10 INJECTION, SOLUTION INTRAVENOUS at 22:24

## 2024-05-03 ASSESSMENT — PAIN - FUNCTIONAL ASSESSMENT
PAIN_FUNCTIONAL_ASSESSMENT: ACTIVITIES ARE NOT PREVENTED
PAIN_FUNCTIONAL_ASSESSMENT: ADULT NONVERBAL PAIN SCALE (NPVS)

## 2024-05-03 ASSESSMENT — PAIN DESCRIPTION - DESCRIPTORS: DESCRIPTORS: STABBING;TENDER;DISCOMFORT

## 2024-05-03 ASSESSMENT — PAIN DESCRIPTION - LOCATION
LOCATION: ABDOMEN
LOCATION: ABDOMEN

## 2024-05-03 ASSESSMENT — PAIN DESCRIPTION - PAIN TYPE: TYPE: ACUTE PAIN

## 2024-05-03 ASSESSMENT — PAIN SCALES - GENERAL
PAINLEVEL_OUTOF10: 7
PAINLEVEL_OUTOF10: 7

## 2024-05-03 ASSESSMENT — PAIN DESCRIPTION - ONSET: ONSET: ON-GOING

## 2024-05-03 ASSESSMENT — PAIN DESCRIPTION - FREQUENCY: FREQUENCY: CONTINUOUS

## 2024-05-03 ASSESSMENT — PAIN DESCRIPTION - ORIENTATION: ORIENTATION: MID

## 2024-05-03 NOTE — CARE COORDINATION
05/03/24 Transition of care: Patient is admitted due to c/o abdominal pain after moving a large object and findings of a left inguinal hernia. He is NPO for lap robot-assisted left inguinal hernia today. He is alert and oriented. He is independent. He does go to Dr Rodriguez. He uses the [facility] on [address] if needed. Will follow for completion of procedure and home going needs. Electronically signed by Viki Valentine RN CM on 5/3/2024 at 11:42 AM      Case Management Assessment  Initial Evaluation    Date/Time of Evaluation: 5/3/2024 11:43 AM  Assessment Completed by: Viik Valentine RN    If patient is discharged prior to next notation, then this note serves as note for discharge by case management.    Patient Name: Ian Cruz III                   YOB: 2000  Diagnosis: Hernia with strangulation [K46.0]  Left inguinal hernia [K40.90]                   Date / Time: 5/2/2024 11:09 PM    Patient Admission Status: Inpatient   Readmission Risk (Low < 19, Mod (19-27), High > 27): Readmission Risk Score: 4.1    Current PCP: No primary care provider on file.  PCP verified by CM? Yes    Chart Reviewed: Yes      History Provided by: Patient, Medical Record  Patient Orientation: Alert and Oriented    Patient Cognition: Alert    Hospitalization in the last 30 days (Readmission):  Yes    If yes, Readmission Assessment in  Navigator will be completed.    Advance Directives:      Code Status: Full Code   Patient's Primary Decision Maker is: Patient Declined (Legal Next of Kin Remains as Decision Maker)      Discharge Planning:    Patient lives with: Spouse/Significant Other, Children Type of Home: House  Primary Care Giver: Self  Patient Support Systems include: Parent, Family Members   Current Financial resources:    Current community resources:    Current services prior to admission: None            Current DME:              Type of Home Care services:  None    ADLS  Prior functional level: Independent in

## 2024-05-03 NOTE — ED PROVIDER NOTES
Immature Granulocytes % 0 0.0 - 5.0 %    Neutrophils Absolute 2.08 1.80 - 7.30 k/uL    Lymphocytes Absolute 2.26 1.50 - 4.00 k/uL    Monocytes Absolute 0.60 0.10 - 0.95 k/uL    Eosinophils Absolute 0.09 0.05 - 0.50 k/uL    Basophils Absolute 0.06 0.00 - 0.20 k/uL    Immature Granulocytes Absolute <0.03 0.00 - 0.58 k/uL   Comprehensive Metabolic Panel w/ Reflex to MG   Result Value Ref Range    Sodium 140 132 - 146 mmol/L    Potassium 3.7 3.5 - 5.0 mmol/L    Chloride 101 98 - 107 mmol/L    CO2 26 22 - 29 mmol/L    Anion Gap 13 7 - 16 mmol/L    Glucose 105 (H) 74 - 99 mg/dL    BUN 13 6 - 20 mg/dL    Creatinine 1.0 0.70 - 1.20 mg/dL    Est, Glom Filt Rate >90 >60 mL/min/1.73m2    Calcium 9.3 8.6 - 10.2 mg/dL    Total Protein 7.9 6.4 - 8.3 g/dL    Albumin 4.5 3.5 - 5.2 g/dL    Total Bilirubin 0.5 0.0 - 1.2 mg/dL    Alkaline Phosphatase 58 40 - 129 U/L    ALT 19 0 - 40 U/L    AST 17 0 - 39 U/L   Urinalysis with Microscopic   Result Value Ref Range    Color, UA Yellow Yellow    Turbidity UA Clear Clear    Glucose, Ur NEGATIVE NEGATIVE mg/dL    Bilirubin, Urine NEGATIVE NEGATIVE    Ketones, Urine NEGATIVE NEGATIVE mg/dL    Specific Gravity, UA 1.010 1.005 - 1.030    Urine Hgb NEGATIVE NEGATIVE    pH, Urine 6.0 5.0 - 9.0    Protein, UA NEGATIVE NEGATIVE mg/dL    Urobilinogen, Urine 0.2 0.0 - 1.0 EU/dL    Nitrite, Urine NEGATIVE NEGATIVE    Leukocyte Esterase, Urine NEGATIVE NEGATIVE    WBC, UA 0 TO 5 0 TO 5 /HPF    RBC, UA 0 TO 2 0 TO 2 /HPF    Bacteria, UA 1+ (A) None   Lactic Acid   Result Value Ref Range    Lactic Acid 1.5 0.5 - 2.2 mmol/L     Imaging:  All Radiology results interpreted by Radiologist unless otherwise noted.  CT ABDOMEN PELVIS W IV CONTRAST Additional Contrast? None   Final Result   1.  Large fat containing left inguinal hernia.  There is inflammatory changes   identified in this region.      (The presence of fat stranding raises suspicion for a possible strangulated   hernia.  Surgical consultation  recommended.)      2.  No evidence of bowel obstruction or inflammation.  The appendix is well   visualized and normal.      3.  Symmetric enhancement of the kidneys.  No hydronephrosis.  No radiopaque   obstructive uropathy.      RECOMMENDATIONS:   Recommend general surgery consultation for discussion of possible management   options for the left inguinal hernia.         US SCROTUM AND TESTICLES   Final Result   1.  Normal appearance of the bilateral testicles.  No evidence of intra   testicular mass.  Normal testicular vascularity.      2.  Small left-sided varicocele.             ED COURSE   Vitals:    Vitals:    05/02/24 1915 05/02/24 1936   BP:  121/73   Pulse: 70    Resp:  18   Temp: 98 °F (36.7 °C)    TempSrc: Oral    SpO2: 97%    Weight:  90.7 kg (200 lb)   Height:  1.829 m (6')       Patient was given the following medications:  Medications   sodium chloride flush 0.9 % injection 10 mL (has no administration in time range)   sodium chloride flush 0.9 % injection 10 mL (has no administration in time range)   0.9 % sodium chloride infusion (has no administration in time range)   ondansetron (ZOFRAN-ODT) disintegrating tablet 4 mg (has no administration in time range)     Or   ondansetron (ZOFRAN) injection 4 mg (has no administration in time range)   enoxaparin (LOVENOX) injection 40 mg (has no administration in time range)   lactated ringers IV soln infusion (has no administration in time range)   acetaminophen (TYLENOL) 160 MG/5ML solution 650 mg (has no administration in time range)   oxyCODONE (ROXICODONE) immediate release tablet 5 mg (has no administration in time range)     Or   oxyCODONE HCl (OXY-IR) immediate release tablet 10 mg (has no administration in time range)   HYDROmorphone (DILAUDID) injection 0.5 mg (has no administration in time range)   iopamidol (ISOVUE-370) 76 % injection 75 mL (75 mLs IntraVENous Given 5/2/24 2203)   HYDROmorphone HCl PF (DILAUDID) injection 1 mg (1 mg IntraMUSCular

## 2024-05-03 NOTE — PROGRESS NOTES
Patient educated on using the incentive spirometer after surgery as well as the benefits. Patient demonstrated correct technique for using the device and verbalized understanding.

## 2024-05-03 NOTE — DISCHARGE INSTRUCTIONS
your ability to think or act clearly.  DO NOT drive or operate machinery or drink alcohol if you are taking narcotic medications.  For minor pain, you may take a nonsteroidal anti-inflammatory drug (NSAID), such as ibuprofen (Naprosyn), or you can take acetaminophen (Tylenol).  Please note that NSAIDs can cause stomach upset or bleeding and acetaminophen (Tylenol) can cause liver damage if taken in larger or more frequent doses then recommended.      Call Our Office  Call our office with questions, to make a follow-up appointment, and if you notice any of the following:  Fever (temperature over 101ºF ) or chills  Persistent nausea, vomiting, or bloating  Severe pain that is not relieved by the prescribed pain medication  Swelling or redness around your incisions   No bowel movement for 2 days and are uncomfortable  Significant change in urination or no urine output for 8 hours  Excess bleeding (from the rectum or incision) - more than ½ cup that does not stop           May 4, 2024       Ian Cruz III YOB: 2000   [address] [facility] [address] Date of Visit:  5/2/2024       To Whom It May Concern:    It is my medical opinion that Ian Cruz may return to light duty immediately with the following restrictions: lifting/carrying not to exceed 10 lbs..    Patient is excused from work until Monday 5/6/24    If you have any questions or concerns, please don't hesitate to call.    Sincerely,    Electronically signed by Jordan Nicholas MD on 5/4/2024 at 6:38 AM

## 2024-05-03 NOTE — PROGRESS NOTES
4 Eyes Skin Assessment     NAME:  Ian Cruz III  YOB: 2000  MEDICAL RECORD NUMBER:  [id number]    The patient is being assessed for  Admission    I agree that at least one RN has performed a thorough Head to Toe Skin Assessment on the patient. ALL assessment sites listed below have been assessed.    Swollen L. Scrotum from injury    Areas assessed by both nurses:    Head, Face, Ears, Shoulders, Back, Chest, Arms, Elbows, Hands, Sacrum. Buttock, Coccyx, Ischium, and Legs. Feet and Heels        Does the Patient have a Wound? No noted wound(s)       Keagan Prevention initiated by RN: No  Wound Care Orders initiated by RN: No    Pressure Injury (Stage 3,4, Unstageable, DTI, NWPT, and Complex wounds) if present, place Wound referral order by RN under : No    New Ostomies, if present place, Ostomy referral order under : No     Nurse 1 eSignature: Electronically signed by Winsome Epps RN on 5/3/24 at 4:21 AM EDT    **SHARE this note so that the co-signing nurse can place an eSignature**    Nurse 2 eSignature: Electronically signed by Roddy Winkler RN on 5/3/24 at 4:28 AM EDT

## 2024-05-03 NOTE — ANESTHESIA PRE PROCEDURE
CREATININE 0.9 05/03/2024 07:07 AM    LABGLOM >90 05/03/2024 07:07 AM    GLUCOSE 89 05/03/2024 07:07 AM    CALCIUM 9.3 05/03/2024 07:07 AM    BILITOT 0.5 05/02/2024 07:48 PM    ALKPHOS 58 05/02/2024 07:48 PM    AST 17 05/02/2024 07:48 PM    ALT 19 05/02/2024 07:48 PM       POC Tests: No results for input(s): \"POCGLU\", \"POCNA\", \"POCK\", \"POCCL\", \"POCBUN\", \"POCHEMO\", \"POCHCT\" in the last 72 hours.    Coags:   Lab Results   Component Value Date/Time    PROTIME 14.0 05/03/2024 07:07 AM    INR 1.3 05/03/2024 07:07 AM       HCG (If Applicable): No results found for: \"PREGTESTUR\", \"PREGSERUM\", \"HCG\", \"HCGQUANT\"     ABGs: No results found for: \"PHART\", \"PO2ART\", \"TUP7MKA\", \"LAS4FYF\", \"BEART\", \"S4ZCJXOP\"     Type & Screen (If Applicable):  No results found for: \"LABABO\"    Drug/Infectious Status (If Applicable):  No results found for: \"HIV\", \"HEPCAB\"    COVID-19 Screening (If Applicable): No results found for: \"COVID19\"        Anesthesia Evaluation  Patient summary reviewed and Nursing notes reviewed   no history of anesthetic complications:   Airway: Mallampati: III  TM distance: >3 FB   Neck ROM: full  Mouth opening: > = 3 FB   Dental: normal exam     Comment: Denied any loose, chipped, or missing teeth    Pulmonary:Negative Pulmonary ROS and normal exam  breath sounds clear to auscultation                             Cardiovascular:Negative CV ROS  Exercise tolerance: good (>4 METS)          Rhythm: regular  Rate: normal           Beta Blocker:  Not on Beta Blocker         Neuro/Psych:   Negative Neuro/Psych ROS              GI/Hepatic/Renal: Neg GI/Hepatic/Renal ROS            Endo/Other: Negative Endo/Other ROS                    Abdominal:   (+) obese          Vascular: negative vascular ROS.         Other Findings:  Left inguinal hernia [K40.90]            Anesthesia Plan      general     ASA 2       Induction: intravenous.  continuous noninvasive hemodynamic monitor  MIPS: Postoperative opioids intended and

## 2024-05-03 NOTE — DISCHARGE SUMMARY
Physician Discharge Summary     Patient ID:  Ian Cruz III  [id number]  23 y.o.  2000    Admit date: 5/2/2024    Discharge date and time: 5/4/2024    Admitting Physician: Christian Silva MD     Admission Diagnoses: Hernia with strangulation [K46.0]  Left inguinal hernia [K40.90]    Discharge Diagnoses: Principal Problem:    Left inguinal hernia  Active Problems:    Hernia with strangulation  Resolved Problems:    * No resolved hospital problems. *      Admission Condition: good    Discharged Condition: stable    Indication for Admission: Left inguinal hernia    Hospital Course/Procedures/Operation/treatments:   5/2: Patient presented with a left fat-containing inguinal hernia.  Patient was admitted for a laparoscopic robot-assisted left inguinal hernia repair  5/3: OR for L inguinal hernia rpr with mesh  5/4: Doing well. Pain controlled. DC home      Consults:   IP CONSULT HOUSE SURGERY    Significant Diagnostic Studies:   CT ABDOMEN PELVIS W IV CONTRAST Additional Contrast? None    Result Date: 5/2/2024  EXAMINATION: CT OF THE ABDOMEN AND PELVIS WITH CONTRAST 5/2/2024 10:00 pm TECHNIQUE: CT of the abdomen and pelvis was performed with the administration of intravenous contrast. Multiplanar reformatted images are provided for review. Automated exposure control, iterative reconstruction, and/or weight based adjustment of the mA/kV was utilized to reduce the radiation dose to as low as reasonably achievable. COMPARISON: None. HISTORY: ORDERING SYSTEM PROVIDED HISTORY: abd pain TECHNOLOGIST PROVIDED HISTORY: Reason for exam:->abd pain Additional Contrast?->None Decision Support Exception - unselect if not a suspected or confirmed emergency medical condition->Emergency Medical Condition (MA) What reading provider will be dictating this exam?->CRC FINDINGS: Lower Chest: There is some right lower lung airspace opacities which are partially imaged.  Left lung is clear.  No pleural effusions.  The mediastinum as imaged

## 2024-05-03 NOTE — H&P
GENERAL SURGERY  HISTORY AND PHYSICAL      Patient's Name/Date of Birth: Ian Cruz III / 2000    Date: May 3, 2024     PCP: No primary care provider on file.     Chief Complaint:   Chief Complaint   Patient presents with    Abdominal Pain     Left lower. Since 04/30/2024    Testicle Pain       HPI  Ian Cruz III is a 23 y.o. male who presents for evaluation of left groin pain. Patient states he was helping a friend move on Monday. He states he went to work the following day and noticed some groin pain and swelling in his left testicle. He has never had this happen before. Pain is worse with movement. US of scrotum negative for any testicular torsion or pathology. CT abdominal pelvis with a large left fat containing inguinal hernia. No evidence of bowel compromise. Labs reviewed no leukocytosis. CMP/LFTs within. No prior surgical history. Does not smoke.  Does not take any blood thinning medication.      Past Medical History:   Diagnosis Date    Benign focal epilepsy of childhood (HCC)     Ages 4-8, 3 seizures, last EEG 8 years ago       History reviewed. No pertinent surgical history.    Prior to Admission medications    Not on File       Allergies   Allergen Reactions    Bee Venom Swelling    Desonide Rash       Family History   Problem Relation Age of Onset    Diabetes Paternal Grandfather     Hypertension Paternal Grandfather     Heart Disease Paternal Grandfather     Cancer Maternal Grandfather         Colon    Sickle Cell Anemia Maternal Grandfather     Cancer Maternal Aunt         Colon    Sickle Cell Trait Maternal Aunt     Liver Disease Mother         Autoimmune hepatitis   ; No family history of problems with anesthesia     Social History     Tobacco Use    Smoking status: Never    Smokeless tobacco: Never   Substance Use Topics    Alcohol use: No     Alcohol/week: 0.0 standard drinks of alcohol    Drug use: No         Review of Systems   Review of Systems   Gastrointestinal:         Left groin

## 2024-05-04 VITALS
HEIGHT: 73 IN | TEMPERATURE: 97.7 F | SYSTOLIC BLOOD PRESSURE: 144 MMHG | OXYGEN SATURATION: 94 % | WEIGHT: 226.7 LBS | DIASTOLIC BLOOD PRESSURE: 89 MMHG | RESPIRATION RATE: 18 BRPM | HEART RATE: 104 BPM | BODY MASS INDEX: 30.05 KG/M2

## 2024-05-04 LAB
ANION GAP SERPL CALCULATED.3IONS-SCNC: 16 MMOL/L (ref 7–16)
BASOPHILS # BLD: 0.01 K/UL (ref 0–0.2)
BASOPHILS NFR BLD: 0 % (ref 0–2)
BUN SERPL-MCNC: 10 MG/DL (ref 6–20)
CALCIUM SERPL-MCNC: 8.8 MG/DL (ref 8.6–10.2)
CHLORIDE SERPL-SCNC: 101 MMOL/L (ref 98–107)
CO2 SERPL-SCNC: 22 MMOL/L (ref 22–29)
CREAT SERPL-MCNC: 1 MG/DL (ref 0.7–1.2)
EOSINOPHIL # BLD: 0 K/UL (ref 0.05–0.5)
EOSINOPHILS RELATIVE PERCENT: 0 % (ref 0–6)
ERYTHROCYTE [DISTWIDTH] IN BLOOD BY AUTOMATED COUNT: 19.5 % (ref 11.5–15)
GFR, ESTIMATED: >90 ML/MIN/1.73M2
GLUCOSE SERPL-MCNC: 117 MG/DL (ref 74–99)
HCT VFR BLD AUTO: 36.6 % (ref 37–54)
HGB BLD-MCNC: 11.5 G/DL (ref 12.5–16.5)
IMM GRANULOCYTES # BLD AUTO: 0.04 K/UL (ref 0–0.58)
IMM GRANULOCYTES NFR BLD: 1 % (ref 0–5)
LYMPHOCYTES NFR BLD: 0.66 K/UL (ref 1.5–4)
LYMPHOCYTES RELATIVE PERCENT: 9 % (ref 20–42)
MCH RBC QN AUTO: 22 PG (ref 26–35)
MCHC RBC AUTO-ENTMCNC: 31.4 G/DL (ref 32–34.5)
MCV RBC AUTO: 70.1 FL (ref 80–99.9)
MONOCYTES NFR BLD: 0.35 K/UL (ref 0.1–0.95)
MONOCYTES NFR BLD: 5 % (ref 2–12)
NEUTROPHILS NFR BLD: 86 % (ref 43–80)
NEUTS SEG NFR BLD: 6.46 K/UL (ref 1.8–7.3)
PLATELET # BLD AUTO: 436 K/UL (ref 130–450)
PMV BLD AUTO: 10.6 FL (ref 7–12)
POTASSIUM SERPL-SCNC: 3.8 MMOL/L (ref 3.5–5)
RBC # BLD AUTO: 5.22 M/UL (ref 3.8–5.8)
SODIUM SERPL-SCNC: 139 MMOL/L (ref 132–146)
WBC OTHER # BLD: 7.5 K/UL (ref 4.5–11.5)

## 2024-05-04 PROCEDURE — 6370000000 HC RX 637 (ALT 250 FOR IP): Performed by: STUDENT IN AN ORGANIZED HEALTH CARE EDUCATION/TRAINING PROGRAM

## 2024-05-04 PROCEDURE — 80048 BASIC METABOLIC PNL TOTAL CA: CPT

## 2024-05-04 PROCEDURE — 2500000003 HC RX 250 WO HCPCS: Performed by: ANESTHESIOLOGY

## 2024-05-04 PROCEDURE — 49650 LAP ING HERNIA REPAIR INIT: CPT | Performed by: SURGERY

## 2024-05-04 PROCEDURE — 2580000003 HC RX 258: Performed by: STUDENT IN AN ORGANIZED HEALTH CARE EDUCATION/TRAINING PROGRAM

## 2024-05-04 PROCEDURE — 36415 COLL VENOUS BLD VENIPUNCTURE: CPT

## 2024-05-04 PROCEDURE — 85025 COMPLETE CBC W/AUTO DIFF WBC: CPT

## 2024-05-04 PROCEDURE — 6370000000 HC RX 637 (ALT 250 FOR IP)

## 2024-05-04 RX ORDER — ENOXAPARIN SODIUM 100 MG/ML
30 INJECTION SUBCUTANEOUS 2 TIMES DAILY
Status: DISCONTINUED | OUTPATIENT
Start: 2024-05-04 | End: 2024-05-04 | Stop reason: HOSPADM

## 2024-05-04 RX ORDER — METHOCARBAMOL 750 MG/1
1500 TABLET, FILM COATED ORAL 4 TIMES DAILY
Qty: 80 TABLET | Refills: 0 | Status: SHIPPED | OUTPATIENT
Start: 2024-05-04 | End: 2024-05-14

## 2024-05-04 RX ORDER — METHOCARBAMOL 750 MG/1
1500 TABLET, FILM COATED ORAL 4 TIMES DAILY
Status: DISCONTINUED | OUTPATIENT
Start: 2024-05-04 | End: 2024-05-04 | Stop reason: HOSPADM

## 2024-05-04 RX ORDER — SODIUM CHLORIDE, SODIUM LACTATE, POTASSIUM CHLORIDE, CALCIUM CHLORIDE 600; 310; 30; 20 MG/100ML; MG/100ML; MG/100ML; MG/100ML
INJECTION, SOLUTION INTRAVENOUS CONTINUOUS
Status: DISCONTINUED | OUTPATIENT
Start: 2024-05-04 | End: 2024-05-04

## 2024-05-04 RX ADMIN — SODIUM CHLORIDE, POTASSIUM CHLORIDE, SODIUM LACTATE AND CALCIUM CHLORIDE: 600; 310; 30; 20 INJECTION, SOLUTION INTRAVENOUS at 01:09

## 2024-05-04 RX ADMIN — ACETAMINOPHEN 650 MG: 650 SOLUTION ORAL at 00:56

## 2024-05-04 RX ADMIN — OXYCODONE 5 MG: 5 TABLET ORAL at 05:08

## 2024-05-04 RX ADMIN — ACETAMINOPHEN 650 MG: 650 SOLUTION ORAL at 05:08

## 2024-05-04 RX ADMIN — HYDROMORPHONE HYDROCHLORIDE 0.5 MG: 1 INJECTION, SOLUTION INTRAMUSCULAR; INTRAVENOUS; SUBCUTANEOUS at 00:05

## 2024-05-04 RX ADMIN — OXYCODONE HYDROCHLORIDE 10 MG: 10 TABLET ORAL at 01:05

## 2024-05-04 ASSESSMENT — PAIN DESCRIPTION - DESCRIPTORS
DESCRIPTORS: ACHING;DISCOMFORT;SHARP
DESCRIPTORS: ACHING;TENDER;SORE
DESCRIPTORS: ACHING;DISCOMFORT;SORE

## 2024-05-04 ASSESSMENT — PAIN SCALES - GENERAL
PAINLEVEL_OUTOF10: 6
PAINLEVEL_OUTOF10: 10
PAINLEVEL_OUTOF10: 8
PAINLEVEL_OUTOF10: 10

## 2024-05-04 ASSESSMENT — PAIN DESCRIPTION - LOCATION
LOCATION: ABDOMEN

## 2024-05-04 ASSESSMENT — PAIN DESCRIPTION - PAIN TYPE: TYPE: SURGICAL PAIN

## 2024-05-04 NOTE — CARE COORDINATION
5/4/24, Discharge Acknowledged. SW spoke with RN.  Patient is discharging to home no needs identified /  SW to follow.      Rhonda Flood JUSTICE  [facility] Case Management  [phone]

## 2024-05-04 NOTE — OP NOTE
Operative Note      Patient: Ian Cruz III  YOB: 2000  MRN: [id number]    Date of Procedure: 5/3/2024    Pre-Op Diagnosis Codes:     * Left inguinal hernia [K40.90]    Post-Op Diagnosis: Same, Incarcerated Fat Containing Left Inguinal Hernia       Procedure(s):  Laparoscopic Robotic Assisted Left Inguinal Hernia Repair With Mesh    Surgeon(s):  Michelle Beltran MD Lane, Stacy A, DO    Assistant:   Resident: Jordan Nicholas MD    Anesthesia: General    Estimated Blood Loss (mL): less than 50     Complications: None    Specimens:   * No specimens in log *    Implants:  Implant Name Type Inv. Item Serial No.  Lot No. LRB No. Used Action   MESH KELLI [id number] POLY POLYLACTIC ACID 70% CLLGN 30% GLYC - [id number]  MESH KELLI [id number] POLY POLYLACTIC ACID 70% CLLGN 30% GLYC  [employer]  SURGICAL-WD [id number] Left 1 Implanted         Drains:   [REMOVED] Urinary Catheter 05/03/24 Abreu (Removed)       Findings:  Infection Present At Time Of Surgery (PATOS) (choose all levels that have infection present):  No infection present  Other Findings: Large chronic left inguinal hernia s/p robotic assisted laparoscopic left inguinal hernia repair with mesh    Detailed Description of Procedure:   The patient was taken to the operating room, placed supine and administered general anesthesia and intubated. Once the airway was secured and he was adequately sedated, he was prepped and draped in the normal sterile fashion. Timeout was performed to confirm surgical site and the patient's name. He received preoperative antibiotics in the form of IV.      We initially made an 8-mm incision superior to the umbilicus, inserted a Veress needle, confirmed the needle placement and insufflated to 15 mmHg. We then removed the Veress needle, inserted an 8-mm trocar, inserted the camera and, following, inspected the abdomen. Upon entrance into the abdomen, we identified a left inguinal hernia that was

## 2024-05-04 NOTE — PROGRESS NOTES
Pharmacist Review and Automatic Dose Adjustment of Prophylactic Enoxaparin    Reviewed reason(s) for admission/hospital problem list    The reviewing pharmacist has made an adjustment to the ordered enoxaparin dose or converted to UFH per the approved [facility] protocol and table as identified below.        Ian Cruz III is a 23 y.o. male.     Recent Labs     05/02/24 1948 05/03/24  0707   CREATININE 1.0 0.9       Estimated Creatinine Clearance: 158 mL/min (based on SCr of 0.9 mg/dL).    Recent Labs     05/02/24 1948 05/03/24  0707   HGB 12.7 12.3*   HCT 40.8 39.5   * 463*     Recent Labs     05/03/24  0707   INR 1.3       Height:   Ht Readings from Last 1 Encounters:   05/03/24 1.854 m (6' 1\")     Weight:  Wt Readings from Last 1 Encounters:   05/03/24 102.8 kg (226 lb 11.2 oz)               Plan: Based upon the patient's weight and renal function    Ordered: Enoxaparin 40mg SUBQ Daily    Changed/converted to    New Order: Enoxaparin 30mg SUBQ BID      Thank you,  Lorrie Manjarrez, [facility]  5/4/2024, 7:31 AM

## 2024-05-04 NOTE — ANESTHESIA POSTPROCEDURE EVALUATION
Department of Anesthesiology  Postprocedure Note    Patient: Ian Cruz III  MRN: [id number]  YOB: 2000  Date of evaluation: 5/4/2024    Procedure Summary       Date: 05/03/24 Room / Location: [address]    Anesthesia Start: 1929 Anesthesia Stop: 2339    Procedure: Laparoscopic Robotic Assisted Left Inguinal Hernia Repair With Mesh (Left: Abdomen) Diagnosis:       Left inguinal hernia      (Left inguinal hernia [K40.90])    Surgeons: Michelle Beltran MD Responsible Provider: Tevin Powell DO    Anesthesia Type: General ASA Status: 2            Anesthesia Type: General    Emory Phase I: Emory Score: 9    Emory Phase II:      Anesthesia Post Evaluation    Patient location during evaluation: PACU  Patient participation: complete - patient participated  Level of consciousness: awake and alert  Airway patency: patent  Nausea & Vomiting: no nausea and no vomiting  Cardiovascular status: blood pressure returned to baseline  Respiratory status: acceptable  Hydration status: euvolemic  Multimodal analgesia pain management approach  Pain management: adequate    No notable events documented.

## 2024-05-20 ENCOUNTER — OFFICE VISIT (OUTPATIENT)
Dept: SURGERY | Age: 24
End: 2024-05-20

## 2024-05-20 VITALS
HEART RATE: 73 BPM | SYSTOLIC BLOOD PRESSURE: 117 MMHG | TEMPERATURE: 98.7 F | WEIGHT: 226 LBS | HEIGHT: 73 IN | RESPIRATION RATE: 16 BRPM | OXYGEN SATURATION: 98 % | DIASTOLIC BLOOD PRESSURE: 74 MMHG | BODY MASS INDEX: 29.95 KG/M2

## 2024-05-20 DIAGNOSIS — K46.0 HERNIA WITH STRANGULATION: Primary | ICD-10-CM

## 2024-05-20 PROCEDURE — 99212 OFFICE O/P EST SF 10 MIN: CPT | Performed by: SURGERY

## 2024-05-20 PROCEDURE — 99024 POSTOP FOLLOW-UP VISIT: CPT | Performed by: SURGERY

## 2024-05-20 RX ORDER — ACETAMINOPHEN 325 MG/1
650 TABLET ORAL EVERY 6 HOURS PRN
COMMUNITY

## 2024-05-20 NOTE — PROGRESS NOTES
[facility]  PROGRESS NOTE  ATTENDING NOTE    Chief Complaint   Patient presents with    Post-Op Check     Post-op, s/p inguinal hernia repair-denies signs of infection     S:  22y/o M s/p lap robo left inguinal hernia repair with mesh.  He has some numbness to the medial thigh and scrotum, but it is improving.  Denies pain.  I went over his CT scan and procedure with him.  I explained the hernia sac was large and part of it was left behind in the scrotum due to this.    /74   Pulse 73   Temp 98.7 °F (37.1 °C)   Resp 16   Ht 1.854 m (6' 1\")   Wt 102.5 kg (226 lb)   SpO2 98%   BMI 29.82 kg/m²   Gen:  NAD  Abd:  soft, NT, ND  Wound;  c/d/I  Left inguinal:  fullness along spermatic cord, but no signs of hernia recurrence    ASSESSMENT/PLAN:  Incarcerated left inguinal hernia--s/p lap robo repair with mesh  --advance activity as tolerated slowly  --ADAT    RTC 1 month    Michelle Beltran MD, MSc, FACS  5/20/2024  5:18 PM

## 2024-06-03 ENCOUNTER — TELEPHONE (OUTPATIENT)
Dept: SURGERY | Age: 24
End: 2024-06-03

## 2024-06-03 NOTE — TELEPHONE ENCOUNTER
MA informed pt of Dr. Beltran's advisement, pt verbalized understanding, pt wants to know if Dr. Beltran is okay with him going back to work today, if so he will need a letter emailed to him to reflect that, if not, he will need a letter giving a return to work date,MA routing to Dr. Beltran for advisement.  Electronically signed by Nettie Goldstein on 6/3/2024 at 11:17 AM

## 2024-06-03 NOTE — TELEPHONE ENCOUNTER
LPN received call from patient who is s/p lap robo repair with mesh with Dr beltran on 5/3/24. Patient seen in office on 5/20/24, next appointment being 6/17/24.    Patient inquiring if restrictions are lifted and patient can go back to work. Patient denies any pain, denies any difficulty moving bowels or urinating. Patient denies any s/s of infection.    Patient can be reached at [phone].    Routing to Dr Beltran for advisement of restrictions.

## 2024-06-03 NOTE — TELEPHONE ENCOUNTER
MA informed pt of letter and emailed to requested email.  Electronically signed by Nettie Goldstein on 6/3/2024 at 2:30 PM

## (undated) DEVICE — GLOVE ORANGE PI 7   MSG9070

## (undated) DEVICE — ROBOTIC: Brand: MEDLINE INDUSTRIES, INC.

## (undated) DEVICE — COVER,MAYO STAND,STERILE: Brand: MEDLINE

## (undated) DEVICE — BLADE CLIPPER GEN PURP NS

## (undated) DEVICE — PAD PT POS 36 IN SURGYPAD DISP

## (undated) DEVICE — SCISSORS SURG DIA8MM MPLR CRV ENDOWRIST

## (undated) DEVICE — GLOVE SURG SZ 65 THK91MIL LTX FREE SYN POLYISOPRENE

## (undated) DEVICE — BLADELESS OBTURATOR: Brand: WECK VISTA

## (undated) DEVICE — SEAL

## (undated) DEVICE — ELECTRODE PT RET AD L9FT HI MOIST COND ADH HYDRGEL CORDED

## (undated) DEVICE — KIT,ANTI FOG,W/SPONGE & FLUID,SOFT PACK: Brand: MEDLINE

## (undated) DEVICE — ARM DRAPE

## (undated) DEVICE — [HIGH FLOW INSUFFLATOR,  DO NOT USE IF PACKAGE IS DAMAGED,  KEEP DRY,  KEEP AWAY FROM SUNLIGHT,  PROTECT FROM HEAT AND RADIOACTIVE SOURCES.]: Brand: PNEUMOSURE

## (undated) DEVICE — COLUMN DRAPE

## (undated) DEVICE — DRAPE,REIN 53X77,STERILE: Brand: MEDLINE

## (undated) DEVICE — INSUFFLATION NEEDLE TO ESTABLISH PNEUMOPERITONEUM.: Brand: INSUFFLATION NEEDLE

## (undated) DEVICE — DRAPE,LAP,CHOLE,W/TROUGHS,STERILE: Brand: MEDLINE

## (undated) DEVICE — TIP COVER ACCESSORY

## (undated) DEVICE — FENESTRATED BIPOLAR FORCEPS: Brand: ENDOWRIST